# Patient Record
Sex: MALE | Race: BLACK OR AFRICAN AMERICAN | Employment: PART TIME | URBAN - METROPOLITAN AREA
[De-identification: names, ages, dates, MRNs, and addresses within clinical notes are randomized per-mention and may not be internally consistent; named-entity substitution may affect disease eponyms.]

---

## 2017-10-25 ENCOUNTER — HOSPITAL ENCOUNTER (EMERGENCY)
Age: 52
Discharge: HOME | End: 2017-10-26
Attending: EMERGENCY MEDICINE | Admitting: EMERGENCY MEDICINE
Payer: COMMERCIAL

## 2017-10-25 ENCOUNTER — APPOINTMENT (OUTPATIENT)
Dept: GENERAL RADIOLOGY | Age: 52
End: 2017-10-25
Attending: EMERGENCY MEDICINE
Payer: COMMERCIAL

## 2017-10-25 DIAGNOSIS — S20.212A CONTUSION OF RIB ON LEFT SIDE, INITIAL ENCOUNTER: ICD-10-CM

## 2017-10-25 DIAGNOSIS — R55 NEAR SYNCOPE: Primary | ICD-10-CM

## 2017-10-25 DIAGNOSIS — S00.83XA CONTUSION OF JAW, INITIAL ENCOUNTER: ICD-10-CM

## 2017-10-25 PROCEDURE — 99282 EMERGENCY DEPT VISIT SF MDM: CPT

## 2017-10-25 PROCEDURE — 80053 COMPREHEN METABOLIC PANEL: CPT | Performed by: EMERGENCY MEDICINE

## 2017-10-25 PROCEDURE — 85025 COMPLETE CBC W/AUTO DIFF WBC: CPT | Performed by: EMERGENCY MEDICINE

## 2017-10-25 PROCEDURE — 71010 XR CHEST PORT: CPT

## 2017-10-25 PROCEDURE — 82550 ASSAY OF CK (CPK): CPT | Performed by: EMERGENCY MEDICINE

## 2017-10-25 PROCEDURE — 93005 ELECTROCARDIOGRAM TRACING: CPT

## 2017-10-25 PROCEDURE — 83735 ASSAY OF MAGNESIUM: CPT | Performed by: EMERGENCY MEDICINE

## 2017-10-25 NOTE — LETTER
NOTIFICATION RETURN TO WORK / SCHOOL 
 
10/26/2017 3:12 AM 
 
Mr. Olayinka Tomlin 
[address] [facility] [place] [address] To Whom It May Concern: 
 
Olayinka Tomlin is currently under the care of [facility] EMERGENCY DEPT. He will return to work/school on: 10/27/2017 If there are questions or concerns please have the patient contact our office.  
 
 
 
Sincerely, 
 
 
Denise Kothari MD

## 2017-10-26 VITALS
OXYGEN SATURATION: 100 % | HEART RATE: 96 BPM | SYSTOLIC BLOOD PRESSURE: 133 MMHG | HEIGHT: 70 IN | TEMPERATURE: 97.1 F | BODY MASS INDEX: 32.35 KG/M2 | WEIGHT: 226 LBS | DIASTOLIC BLOOD PRESSURE: 85 MMHG | RESPIRATION RATE: 16 BRPM

## 2017-10-26 LAB
ALBUMIN SERPL-MCNC: 3.4 G/DL (ref 3.4–5)
ALBUMIN/GLOB SERPL: 1 {RATIO} (ref 0.8–1.7)
ALP SERPL-CCNC: 147 U/L (ref 45–117)
ALT SERPL-CCNC: 32 U/L (ref 16–61)
ANION GAP SERPL CALC-SCNC: 9 MMOL/L (ref 3–18)
AST SERPL-CCNC: 13 U/L (ref 15–37)
ATRIAL RATE: 101 BPM
BASOPHILS # BLD: 0 K/UL (ref 0–0.1)
BASOPHILS NFR BLD: 0 % (ref 0–2)
BILIRUB SERPL-MCNC: 0.2 MG/DL (ref 0.2–1)
BUN SERPL-MCNC: 27 MG/DL (ref 7–18)
BUN/CREAT SERPL: 21 (ref 12–20)
CALCIUM SERPL-MCNC: 8.5 MG/DL (ref 8.5–10.1)
CALCULATED P AXIS, ECG09: 23 DEGREES
CALCULATED R AXIS, ECG10: 6 DEGREES
CALCULATED T AXIS, ECG11: -8 DEGREES
CHLORIDE SERPL-SCNC: 104 MMOL/L (ref 100–108)
CK MB CFR SERPL CALC: 1 % (ref 0–4)
CK MB SERPL-MCNC: 2.3 NG/ML (ref 5–25)
CK SERPL-CCNC: 230 U/L (ref 39–308)
CO2 SERPL-SCNC: 24 MMOL/L (ref 21–32)
CREAT SERPL-MCNC: 1.3 MG/DL (ref 0.6–1.3)
DIAGNOSIS, 93000: NORMAL
DIFFERENTIAL METHOD BLD: ABNORMAL
EOSINOPHIL # BLD: 0.3 K/UL (ref 0–0.4)
EOSINOPHIL NFR BLD: 3 % (ref 0–5)
ERYTHROCYTE [DISTWIDTH] IN BLOOD BY AUTOMATED COUNT: 14.1 % (ref 11.6–14.5)
FLUAV AG NPH QL IA: NEGATIVE
FLUBV AG NOSE QL IA: NEGATIVE
GLOBULIN SER CALC-MCNC: 3.5 G/DL (ref 2–4)
GLUCOSE SERPL-MCNC: 90 MG/DL (ref 74–99)
HCT VFR BLD AUTO: 38.7 % (ref 36–48)
HGB BLD-MCNC: 13.5 G/DL (ref 13–16)
LYMPHOCYTES # BLD: 2.3 K/UL (ref 0.9–3.6)
LYMPHOCYTES NFR BLD: 30 % (ref 21–52)
MAGNESIUM SERPL-MCNC: 2.1 MG/DL (ref 1.6–2.6)
MCH RBC QN AUTO: 31.5 PG (ref 24–34)
MCHC RBC AUTO-ENTMCNC: 34.9 G/DL (ref 31–37)
MCV RBC AUTO: 90.4 FL (ref 74–97)
MONOCYTES # BLD: 0.7 K/UL (ref 0.05–1.2)
MONOCYTES NFR BLD: 9 % (ref 3–10)
NEUTS SEG # BLD: 4.5 K/UL (ref 1.8–8)
NEUTS SEG NFR BLD: 58 % (ref 40–73)
P-R INTERVAL, ECG05: 144 MS
PLATELET # BLD AUTO: 235 K/UL (ref 135–420)
PMV BLD AUTO: 10.2 FL (ref 9.2–11.8)
POTASSIUM SERPL-SCNC: 3.5 MMOL/L (ref 3.5–5.5)
PROT SERPL-MCNC: 6.9 G/DL (ref 6.4–8.2)
Q-T INTERVAL, ECG07: 358 MS
QRS DURATION, ECG06: 90 MS
QTC CALCULATION (BEZET), ECG08: 464 MS
RBC # BLD AUTO: 4.28 M/UL (ref 4.7–5.5)
SODIUM SERPL-SCNC: 137 MMOL/L (ref 136–145)
TROPONIN I SERPL-MCNC: <0.02 NG/ML (ref 0–0.04)
VENTRICULAR RATE, ECG03: 101 BPM
WBC # BLD AUTO: 7.7 K/UL (ref 4.6–13.2)

## 2017-10-26 PROCEDURE — 74011250636 HC RX REV CODE- 250/636: Performed by: EMERGENCY MEDICINE

## 2017-10-26 PROCEDURE — 96361 HYDRATE IV INFUSION ADD-ON: CPT

## 2017-10-26 PROCEDURE — 96374 THER/PROPH/DIAG INJ IV PUSH: CPT

## 2017-10-26 PROCEDURE — 87804 INFLUENZA ASSAY W/OPTIC: CPT | Performed by: EMERGENCY MEDICINE

## 2017-10-26 RX ORDER — KETOROLAC TROMETHAMINE 30 MG/ML
15 INJECTION, SOLUTION INTRAMUSCULAR; INTRAVENOUS
Status: COMPLETED | OUTPATIENT
Start: 2017-10-26 | End: 2017-10-26

## 2017-10-26 RX ADMIN — SODIUM CHLORIDE 1000 ML: 9 INJECTION, SOLUTION INTRAVENOUS at 00:51

## 2017-10-26 RX ADMIN — KETOROLAC TROMETHAMINE 15 MG: 30 INJECTION, SOLUTION INTRAMUSCULAR at 00:51

## 2017-10-26 NOTE — ED NOTES
Patient for discharge home in no acute distress at this time. Discharge instructions given spouse with patient.

## 2017-10-26 NOTE — DISCHARGE INSTRUCTIONS
Vasovagal Syncope: Care Instructions  Your Care Instructions    Vasovagal syncope (say \"cws-[id number]-GFRRiana CONDE-kuh-pee\")is sudden dizziness or fainting that can be set off by things such as pain, stress, fear, or trauma. You may sweat or feel lightheaded, sick to your stomach, or tingly. The problem causes the heart rate to slow and the blood vessels to widen, or dilate, for a short time. When this happens, blood pools in the lower body, and less blood goes to the brain. You can usually get relief by lying down with your legs raised (elevated). This helps more blood to flow to your brain and may help relieve symptoms like feeling dizzy. Some doctors may recommend a technique that involves tensing your fists and arms. This type of fainting is often easy to predict. For example, it happens to some people when they see blood or have to get a shot. They may feel symptoms before they faint. An episode of vasovagal syncope usually responds well to self-care. Other treatment often isn't needed. But if the fainting keeps happening, your doctor may suggest further treatments. Follow-up care is a key part of your treatment and safety. Be sure to make and go to all appointments, and call your doctor if you are having problems. It's also a good idea to know your test results and keep a list of the medicines you take. How can you care for yourself at home? · Drink plenty of fluids to prevent dehydration. If you have kidney, heart, or liver disease and have to limit fluids, talk with your doctor before you increase your fluid intake. · Try to avoid things that you think may set off vasovagal syncope. · Talk to your doctor about any medicines you take. Some medicines may increase the chance of this condition occurring. · If you feel symptoms, lie down with your legs raised. Talk to your doctor about what to do if your symptoms come back. When should you call for help?   Call 911 anytime you think you may need emergency care. For example, call if:  ? · You have symptoms of a heart problem. These may include:  ¨ Chest pain or pressure. ¨ Severe trouble breathing. ¨ A fast or irregular heartbeat. ? Watch closely for changes in your health, and be sure to contact your doctor if:  ? · You have more episodes of fainting at home. ? · You do not get better as expected. Where can you learn more? Go to http://mae-rakan.info/. Enter L754 in the search box to learn more about \"Vasovagal Syncope: Care Instructions. \"  Current as of: March 20, 2017  Content Version: 11.4  © 9490-1706 [employer]. Care instructions adapted under license by [employer] (which disclaims liability or warranty for this information). If you have questions about a medical condition or this instruction, always ask your healthcare professional. [place] [address] any warranty or liability for your use of this information.

## 2017-10-26 NOTE — ED PROVIDER NOTES
HPI Comments: Seen at: 10/26/17 12:21 AM    Daksha Moss is a 45 y.o. male with no PMHx presenting to the ED with c/o near syncopal episode that occurred tonight. Pt states he was sitting on the commode having a BM when he began to feel lightheaded and dizzy. Reports when he stood up from the bathroom he stumbled back to the bad and fell to the floor. Denies LOC, but \"got close. \" Patient does report that he hit his jaw and ribs when he fall and notes left sided rib pain and right sided jaw pain from the fall. Pt denies nausea, vomiting, diarrhea, fever, or chills, Associated sx include productive cough and nasal congestion that he has had for the last week. Pt denies hx of CVA, MI, blood clots or any cardiac problems. Pt states he is feeling better here at the ED. Denies any CP, SOB or palpitations preceding the near syncopal event. Severity is 0/10. Pt has no other sx or complaints. PCP: MD Ananya      The history is provided by the patient. No past medical history on file. No past surgical history on file. No family history on file. Social History     Social History    Marital status:      Spouse name: N/A    Number of children: N/A    Years of education: N/A     Occupational History    Not on file. Social History Main Topics    Smoking status: Not on file    Smokeless tobacco: Not on file    Alcohol use Not on file    Drug use: Not on file    Sexual activity: Not on file     Other Topics Concern    Not on file     Social History Narrative         ALLERGIES: Review of patient's allergies indicates no known allergies. Review of Systems   Constitutional: Negative. Negative for chills and fever. HENT: Positive for congestion (nasal congestion ). Eyes: Negative. Negative for visual disturbance. Respiratory: Positive for cough. Negative for shortness of breath. Cardiovascular: Negative. Negative for chest pain and leg swelling. Gastrointestinal: Negative for abdominal pain, diarrhea and vomiting. Genitourinary: Negative. Negative for dysuria. Musculoskeletal: Negative. Negative for back pain and myalgias. Left sided rib pain   Right sided jaw pain   Skin: Negative. Negative for rash and wound. Neurological: Positive for syncope (near syncope) and light-headedness. Negative for dizziness and weakness. Psychiatric/Behavioral: Negative. Negative for self-injury. All other systems reviewed and are negative. Vitals:    10/25/17 2324 10/26/17 0244   BP: 104/55 133/85   Pulse: 100 96   Resp: 21 16   Temp: 97 °F (36.1 °C) 97.1 °F (36.2 °C)   SpO2: 100%    Weight: 102.5 kg (226 lb)    Height: 5' 10\" (1.778 m)             Physical Exam   Constitutional: He is oriented to person, place, and time. He appears well-developed and well-nourished. No distress. HENT:   Head: Normocephalic and atraumatic. Mouth/Throat: Mucous membranes are dry. Nasal congestion   Mild tenderness over right jaw   No crepitus   No trismus   Normal occlusion   No blood in mouth      Eyes: Conjunctivae and EOM are normal. Pupils are equal, round, and reactive to light. No scleral icterus. Neck: Normal range of motion. Neck supple. No JVD present. No thyromegaly present. Cardiovascular: Normal rate, regular rhythm, S1 normal and S2 normal.  Exam reveals no gallop and no friction rub. No murmur heard. Pulmonary/Chest: Effort normal and breath sounds normal. No accessory muscle usage. No respiratory distress. Abdominal: Soft. Normal appearance. He exhibits no distension. There is no tenderness. There is no rigidity, no rebound and no guarding. Musculoskeletal: Normal range of motion. He exhibits no edema or tenderness. Mild tenderness over the left lower lateral ribs at the posterior axillary line    Neurological: He is alert and oriented to person, place, and time. He has normal strength. No cranial nerve deficit or sensory deficit. Coordination normal.   Skin: Skin is warm and intact. No rash noted. Psychiatric: He has a normal mood and affect. His speech is normal and behavior is normal.   Vitals reviewed. MDM  Number of Diagnoses or Management Options  Contusion of jaw, initial encounter:   Contusion of rib on left side, initial encounter:   Near syncope:   Diagnosis management comments: Olayinka Tomlin is a 45 y.o. Male coming in after a near syncopal event that started while having a BM. Well appearing, low suspicion of cardiac etiology of symptoms. Likely vasovagal syncope. Pateint labs and EKG reassuring. No sx consistent with PE. Will d/c home to follow up with PCP as an outpatient. ED Course       Procedures    Vitals:  Patient Vitals for the past 12 hrs:   Temp Pulse Resp BP SpO2   10/26/17 0244 97.1 °F (36.2 °C) 96 16 133/85 -   10/25/17 2324 97 °F (36.1 °C) 100 21 104/55 100 %       Medications Ordered:  Medications   sodium chloride 0.9 % bolus infusion 1,000 mL (1,000 mL IntraVENous New Bag 10/26/17 0051)   ketorolac (TORADOL) injection 15 mg (15 mg IntraVENous Given 10/26/17 0051)       Lab Findings:  Recent Results (from the past 12 hour(s))   EKG, 12 LEAD, INITIAL    Collection Time: 10/25/17 11:32 PM   Result Value Ref Range    Ventricular Rate 101 BPM    Atrial Rate 101 BPM    P-R Interval 144 ms    QRS Duration 90 ms    Q-T Interval 358 ms    QTC Calculation (Bezet) 464 ms    Calculated P Axis 23 degrees    Calculated R Axis 6 degrees    Calculated T Axis -8 degrees    Diagnosis       Sinus tachycardia  Minimal voltage criteria for LVH, may be normal variant  Borderline ECG  No previous ECGs available     INFLUENZA A & B AG (RAPID TEST)    Collection Time: 10/26/17  2:02 AM   Result Value Ref Range    Influenza A Antigen NEGATIVE  NEG      Influenza B Antigen NEGATIVE  NEG         EKG Interpretation by ED physician:  Sinus tachycardia.  Rate of 101 bpm. No evidence of WPW, HOCM, Brugada, prolonged QTc, or acute ischemia on ekg. X-ray, CT or radiology findings or impressions:  XR CHEST PORT   Interpretation by ED physician: Old right rib fracture. No acute process. Reevaluation of the patient:     2:35 AM: Pt is sitting in bed, denies any lightheadedness, but is still complaining of slight cough. No SOB. Pt walked to bathroom without any difficulty or lightheadedness. No other complaints. Discussed follow up and return precautions and patient in agreement with plan. Diagnosis:   1. Near syncope    2. Contusion of rib on left side, initial encounter    3. Contusion of jaw, initial encounter        Disposition: Discharge    Follow-up Information     Follow up With Details Comments [address] [facility] Jigna Chaves MD Call in 1 day  [address] [place] [address] [address] [facility] 61      [facility] EMERGENCY DEPT  As needed, If symptoms worsen [address] [facility] [address]  [phone]           Patient's Medications   Start Taking    No medications on file   Continue Taking    OTHER    For hypertension, pt unable to recall name of medicine   These Medications have changed    No medications on file   Stop Taking    No medications on file       Scribe Attestation      Mary Dietrich acting as a scribe for and in the presence of Yaima Vivar MD      October 25, 2017 at 11:37 PM       Provider Attestation:      I personally performed the services described in the documentation, reviewed the documentation, as recorded by the scribe in my presence, and it accurately and completely records my words and actions.  October 25, 2017 at 11:37 PM - Yaima Vivar MD

## 2018-05-04 ENCOUNTER — OFFICE VISIT (OUTPATIENT)
Dept: ORTHOPEDIC SURGERY | Age: 53
End: 2018-05-04

## 2018-05-04 VITALS
WEIGHT: 218.4 LBS | HEIGHT: 70 IN | OXYGEN SATURATION: 97 % | TEMPERATURE: 97.6 F | RESPIRATION RATE: 16 BRPM | HEART RATE: 96 BPM | SYSTOLIC BLOOD PRESSURE: 124 MMHG | BODY MASS INDEX: 31.26 KG/M2 | DIASTOLIC BLOOD PRESSURE: 81 MMHG

## 2018-05-04 DIAGNOSIS — M25.561 CHRONIC PAIN OF RIGHT KNEE: Primary | ICD-10-CM

## 2018-05-04 DIAGNOSIS — G89.29 CHRONIC LEFT SHOULDER PAIN: ICD-10-CM

## 2018-05-04 DIAGNOSIS — M17.11 PRIMARY OSTEOARTHRITIS OF RIGHT KNEE: ICD-10-CM

## 2018-05-04 DIAGNOSIS — M25.512 CHRONIC LEFT SHOULDER PAIN: ICD-10-CM

## 2018-05-04 DIAGNOSIS — G89.29 CHRONIC PAIN OF RIGHT KNEE: Primary | ICD-10-CM

## 2018-05-04 DIAGNOSIS — M25.512 LEFT SHOULDER PAIN, UNSPECIFIED CHRONICITY: ICD-10-CM

## 2018-05-04 RX ORDER — BETAMETHASONE SODIUM PHOSPHATE AND BETAMETHASONE ACETATE 3; 3 MG/ML; MG/ML
6 INJECTION, SUSPENSION INTRA-ARTICULAR; INTRALESIONAL; INTRAMUSCULAR; SOFT TISSUE ONCE
Qty: 1 ML | Refills: 0 | Status: CANCELLED
Start: 2018-05-04 | End: 2018-05-04

## 2018-05-04 RX ORDER — OXYCODONE AND ACETAMINOPHEN 5; 325 MG/1; MG/1
TABLET ORAL
COMMUNITY
End: 2018-07-17

## 2018-05-04 RX ORDER — IBUPROFEN 800 MG/1
800 TABLET ORAL
COMMUNITY
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
## 2018-05-11 ENCOUNTER — TELEPHONE (OUTPATIENT)
Dept: ORTHOPEDIC SURGERY | Age: 53
End: 2018-05-11

## 2018-05-14 NOTE — TELEPHONE ENCOUNTER
Message given to Eris Pruett in the [facility] to call patient and schedule an appointment per PABLO Bond.

## 2018-07-13 ENCOUNTER — HOSPITAL ENCOUNTER (OUTPATIENT)
Dept: OCCUPATIONAL MEDICINE | Age: 53
Discharge: HOME | End: 2018-07-13
Attending: FAMILY MEDICINE

## 2018-07-13 DIAGNOSIS — T14.90XA TRAUMA: ICD-10-CM

## 2018-07-17 ENCOUNTER — OFFICE VISIT (OUTPATIENT)
Dept: ORTHOPEDIC SURGERY | Age: 53
End: 2018-07-17

## 2018-07-17 ENCOUNTER — HOSPITAL ENCOUNTER (OUTPATIENT)
Dept: LAB | Age: 53
Discharge: HOME | End: 2018-07-17
Payer: COMMERCIAL

## 2018-07-17 VITALS
DIASTOLIC BLOOD PRESSURE: 86 MMHG | WEIGHT: 217 LBS | HEIGHT: 70 IN | BODY MASS INDEX: 31.07 KG/M2 | OXYGEN SATURATION: 97 % | SYSTOLIC BLOOD PRESSURE: 139 MMHG | TEMPERATURE: 97.1 F | HEART RATE: 87 BPM

## 2018-07-17 DIAGNOSIS — L02.511 ABSCESS OF THUMB, RIGHT: ICD-10-CM

## 2018-07-17 DIAGNOSIS — L02.511 ABSCESS OF THUMB, RIGHT: Primary | ICD-10-CM

## 2018-07-17 LAB
ANION GAP SERPL CALC-SCNC: 7 MMOL/L (ref 3–18)
BASOPHILS # BLD: 0 K/UL (ref 0–0.06)
BASOPHILS NFR BLD: 0 % (ref 0–2)
BUN SERPL-MCNC: 21 MG/DL (ref 7–18)
BUN/CREAT SERPL: 22 (ref 12–20)
CALCIUM SERPL-MCNC: 9 MG/DL (ref 8.5–10.1)
CHLORIDE SERPL-SCNC: 107 MMOL/L (ref 100–108)
CO2 SERPL-SCNC: 28 MMOL/L (ref 21–32)
CREAT SERPL-MCNC: 0.97 MG/DL (ref 0.6–1.3)
DIFFERENTIAL METHOD BLD: ABNORMAL
EOSINOPHIL # BLD: 0.4 K/UL (ref 0–0.4)
EOSINOPHIL NFR BLD: 5 % (ref 0–5)
ERYTHROCYTE [DISTWIDTH] IN BLOOD BY AUTOMATED COUNT: 14 % (ref 11.6–14.5)
GLUCOSE SERPL-MCNC: 87 MG/DL (ref 74–99)
HCT VFR BLD AUTO: 41.3 % (ref 36–48)
HGB BLD-MCNC: 14.4 G/DL (ref 13–16)
LYMPHOCYTES # BLD: 2.6 K/UL (ref 0.9–3.6)
LYMPHOCYTES NFR BLD: 37 % (ref 21–52)
MCH RBC QN AUTO: 31 PG (ref 24–34)
MCHC RBC AUTO-ENTMCNC: 34.9 G/DL (ref 31–37)
MCV RBC AUTO: 89 FL (ref 74–97)
MONOCYTES # BLD: 0.7 K/UL (ref 0.05–1.2)
MONOCYTES NFR BLD: 10 % (ref 3–10)
NEUTS SEG # BLD: 3.4 K/UL (ref 1.8–8)
NEUTS SEG NFR BLD: 48 % (ref 40–73)
PLATELET # BLD AUTO: 292 K/UL (ref 135–420)
PMV BLD AUTO: 10.5 FL (ref 9.2–11.8)
POTASSIUM SERPL-SCNC: 4.4 MMOL/L (ref 3.5–5.5)
RBC # BLD AUTO: 4.64 M/UL (ref 4.7–5.5)
SODIUM SERPL-SCNC: 142 MMOL/L (ref 136–145)
WBC # BLD AUTO: 7.2 K/UL (ref 4.6–13.2)

## 2018-07-17 PROCEDURE — 93005 ELECTROCARDIOGRAM TRACING: CPT

## 2018-07-17 PROCEDURE — 80048 BASIC METABOLIC PNL TOTAL CA: CPT | Performed by: PHYSICIAN ASSISTANT

## 2018-07-17 PROCEDURE — 85025 COMPLETE CBC W/AUTO DIFF WBC: CPT | Performed by: PHYSICIAN ASSISTANT

## 2018-07-17 PROCEDURE — 36415 COLL VENOUS BLD VENIPUNCTURE: CPT | Performed by: PHYSICIAN ASSISTANT

## 2018-07-17 RX ORDER — COLCHICINE 0.6 MG/1
0.6 TABLET ORAL
COMMUNITY
Start: 2018-04-29

## 2018-07-17 RX ORDER — AMOXICILLIN AND CLAVULANATE POTASSIUM 875; 125 MG/1; MG/1
1 TABLET, FILM COATED ORAL 2 TIMES DAILY
COMMUNITY
Start: 2018-07-13 | End: 2019-10-22

## 2018-07-17 RX ORDER — LISINOPRIL AND HYDROCHLOROTHIAZIDE 10; 12.5 MG/1; MG/1
1 TABLET ORAL DAILY
COMMUNITY
Start: 2018-07-13
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
## 2018-07-17 NOTE — LETTER
Patient: Jeanne Galvez Surgery Date: 7/18/18  Time: 12:30PM 
 
PROCEDURE: RIGHT THUMB I&D Report for your Surgery at 11:30  @ [facility]: 
  
o Five (5) days prior to surgery STOP taking ASPIRIN, ANTI-INFLAMMATORY , and/or BLOOD THINNER MEDICATIONS (such as COUMADIN, PLAVIX, HEPARIN or others). o If you are taking blood thinner medication please contact your prescribing physician for any special instructions. LAB: Report to  [facility]  or [facility] [place]  14 days before your surgery date. (Your orders for these tests are in [address] [facility] at the [address] may not eat or drink anything after midnight the night before  
o It doesn't matter if you have eaten before going to the [facility] 
o If required labs and EKG are not completed Surgery will be canceled. THE DAY OF SURGERY: 
  
 
o Do not eat, chew gum or drink anything after Midnight prior to the date of your surgery 
o Take your regular medications with small sips of water unless otherwise instructed. (This means blood pressure and/or heart medicine) If you are insulin dependent, bring your insulin with you, unless otherwise instructed. 
o Bring a list of your medications and the dosage to the hospital. 
o Do not wear nail polish, make-up or jewelry. o Do not bring valuables or money to the hospital. 
o Have someone accompany you so they may drive you home following the surgery. Bring your drivers license, your insurance card and Hospital co-pay. Return for your postoperative visit on Wednesday, July 25, 2018 02:15 PM at the [facility] SURGICAL [id number] [place], [address] This appointment will be with , Dr. Joy Morgna physician assistant. Wily Dozier  [phone]      Surgical Coordinator

## 2018-07-18 LAB
ATRIAL RATE: 81 BPM
CALCULATED P AXIS, ECG09: 63 DEGREES
CALCULATED R AXIS, ECG10: 25 DEGREES
CALCULATED T AXIS, ECG11: 23 DEGREES
DIAGNOSIS, 93000: NORMAL
P-R INTERVAL, ECG05: 142 MS
Q-T INTERVAL, ECG07: 364 MS
QRS DURATION, ECG06: 76 MS
QTC CALCULATION (BEZET), ECG08: 422 MS
VENTRICULAR RATE, ECG03: 81 BPM

## 2018-07-19 ENCOUNTER — HOME HEALTH ADMISSION (OUTPATIENT)
Dept: HOME HEALTH SERVICES | Facility: HOME HEALTH | Age: 53
End: 2018-07-19
Payer: OTHER MISCELLANEOUS

## 2018-07-19 ENCOUNTER — HOSPITAL ENCOUNTER (OUTPATIENT)
Dept: LAB | Age: 53
Discharge: HOME | End: 2018-07-19
Payer: COMMERCIAL

## 2018-07-19 PROCEDURE — 87070 CULTURE OTHR SPECIMN AEROBIC: CPT | Performed by: ORTHOPAEDIC SURGERY

## 2018-07-19 PROCEDURE — 87077 CULTURE AEROBIC IDENTIFY: CPT | Performed by: ORTHOPAEDIC SURGERY

## 2018-07-19 PROCEDURE — 87075 CULTR BACTERIA EXCEPT BLOOD: CPT | Performed by: ORTHOPAEDIC SURGERY

## 2018-07-19 PROCEDURE — 87186 SC STD MICRODIL/AGAR DIL: CPT | Performed by: ORTHOPAEDIC SURGERY

## 2018-07-19 PROCEDURE — 87205 SMEAR GRAM STAIN: CPT | Performed by: ORTHOPAEDIC SURGERY

## 2018-07-20 ENCOUNTER — HOME CARE VISIT (OUTPATIENT)
Dept: SCHEDULING | Facility: HOME HEALTH | Age: 53
End: 2018-07-20
Payer: OTHER MISCELLANEOUS

## 2018-07-20 PROCEDURE — 400013 HH SOC

## 2018-07-20 PROCEDURE — G0299 HHS/HOSPICE OF RN EA 15 MIN: HCPCS

## 2018-07-21 ENCOUNTER — HOME CARE VISIT (OUTPATIENT)
Dept: SCHEDULING | Facility: HOME HEALTH | Age: 53
End: 2018-07-21
Payer: OTHER MISCELLANEOUS

## 2018-07-21 VITALS
TEMPERATURE: 97 F | OXYGEN SATURATION: 98 % | SYSTOLIC BLOOD PRESSURE: 110 MMHG | DIASTOLIC BLOOD PRESSURE: 60 MMHG | HEART RATE: 72 BPM

## 2018-07-21 PROCEDURE — G0299 HHS/HOSPICE OF RN EA 15 MIN: HCPCS

## 2018-07-22 ENCOUNTER — HOME CARE VISIT (OUTPATIENT)
Dept: SCHEDULING | Facility: HOME HEALTH | Age: 53
End: 2018-07-22
Payer: OTHER MISCELLANEOUS

## 2018-07-22 LAB
BACTERIA SPEC CULT: ABNORMAL
BACTERIA SPEC CULT: ABNORMAL
GRAM STN SPEC: ABNORMAL
GRAM STN SPEC: ABNORMAL
SERVICE CMNT-IMP: ABNORMAL

## 2018-07-22 PROCEDURE — G0299 HHS/HOSPICE OF RN EA 15 MIN: HCPCS

## 2018-07-23 ENCOUNTER — DOCUMENTATION ONLY (OUTPATIENT)
Dept: ORTHOPEDIC SURGERY | Age: 53
End: 2018-07-23

## 2018-07-23 ENCOUNTER — HOME CARE VISIT (OUTPATIENT)
Dept: SCHEDULING | Facility: HOME HEALTH | Age: 53
End: 2018-07-23
Payer: OTHER MISCELLANEOUS

## 2018-07-23 ENCOUNTER — OFFICE VISIT (OUTPATIENT)
Dept: ORTHOPEDIC SURGERY | Age: 53
End: 2018-07-23

## 2018-07-23 VITALS
HEIGHT: 70 IN | HEART RATE: 93 BPM | WEIGHT: 221.4 LBS | RESPIRATION RATE: 16 BRPM | SYSTOLIC BLOOD PRESSURE: 122 MMHG | BODY MASS INDEX: 31.7 KG/M2 | DIASTOLIC BLOOD PRESSURE: 68 MMHG | OXYGEN SATURATION: 96 % | TEMPERATURE: 97.5 F

## 2018-07-23 VITALS
RESPIRATION RATE: 16 BRPM | OXYGEN SATURATION: 98 % | TEMPERATURE: 97 F | DIASTOLIC BLOOD PRESSURE: 76 MMHG | SYSTOLIC BLOOD PRESSURE: 122 MMHG | HEART RATE: 78 BPM

## 2018-07-23 DIAGNOSIS — L02.511 ABSCESS OF THUMB, RIGHT: Primary | ICD-10-CM

## 2018-07-23 DIAGNOSIS — Z98.890 STATUS POST INCISION AND DRAINAGE: ICD-10-CM

## 2018-07-23 LAB
BACTERIA SPEC CULT: ABNORMAL
GRAM STN SPEC: ABNORMAL
GRAM STN SPEC: ABNORMAL
SERVICE CMNT-IMP: ABNORMAL

## 2018-07-23 PROCEDURE — G0299 HHS/HOSPICE OF RN EA 15 MIN: HCPCS

## 2018-07-23 NOTE — PROGRESS NOTES
Nessa Mitchell  1965     HISTORY OF PRESENT ILLNESS  Nessa Mitchell is a 52 y.o. male who presents today for evaluation s/p right thumb incision and drainage on 7/19/18. He comes in today for follow up. He rates his pain 0/10 today. Home health changed his dressing today. He is doing well and has been talking his ABX as prescribed. He does not have concerns or questions today. Patient denies any fever, chills, chest pain, shortness of breath or calf pain. There are no new illness or injuries to report since last seen in the office. PHYSICAL EXAM:   Visit Vitals    /68    Pulse 93    Temp 97.5 °F (36.4 °C) (Oral)    Resp 16    Ht 5' 10\" (1.778 m)    Wt 221 lb 6.4 oz (100.4 kg)    SpO2 96%    BMI 31.77 kg/m2      The patient is a well-developed, well-nourished male in no acute distress. The patient is alert and oriented times three. The patient appears to be well groomed. Mood and affect are normal.  ORTHOPEDIC EXAM of right thumb:   Inspection: minimal swelling, no bruising, dressing in place  Incision, clean, dry, intact, sutures in place  Range of motion: N/A  ttp none  Stability: Stable  Strength: N/A    CULTURES:   Wound  Component Results      Component Value Ref Range & Units Status     Special Requests: RIGHT   THUMB      Final     GRAM STAIN FEW WBC'S   Final     GRAM STAIN    Final     RARE GRAM POSITIVE COCCI IN PAIRS     Culture result:    Final     FEW ESCHERICHIA COLI 2 MORPHOTYPES SAME SUSCEPTIBILITY (A)     Culture result:    Final     FEW STAPHYLOCOCCUS LUGDUNENSIS (A)     Culture result:    Final     RARE BACILLUS SPECIES, NOT ANTHRACIS (A)       Tissue  Component Results      Component Value Ref Range & Units Status     Special Requests: RIGHT   THUMB      Final     GRAM STAIN NO WBC'S SEEN   Final     GRAM STAIN NO ORGANISMS SEEN   Final     Culture result: FEW ESCHERICHIA COLI (A)   Final     Culture result:    Final     FEW STAPHYLOCOCCUS LUGDUNENSIS (A)     Anerobic  Component Results      Component Value Ref Range & Units Status     Special Requests: RIGHT   THUMB      Preliminary     Culture result: NO ANAEROBES ISOLATED 4 DAYS   Preliminary             XR: read and reviewed by Dr. Carin Calderon:      ICD-10-CM ICD-9-CM    1. Abscess of thumb, right L02.511 681.00 AMB POC XRAY, HAND; 3+ VIEWS      REFERRAL TO INFECTIOUS DISEASE   2. Status post incision and drainage Z98.890 V45.89 AMB POC XRAY, HAND; 3+ VIEWS      REFERRAL TO INFECTIOUS DISEASE        PLAN:   Pt doing well post operatively  He will continue his ABX as prescribed  Will get him in to see Dr. Aaron Lawton with ID to evaluate him in the next day or two  Will have him come back in next week to look at his incision and see how he is doing.     Patient seen and evaluated by Dr. Sara Martinez today who agrees with treatment plan    Follow-up Disposition: Not on [address] [place] [address], [place] [address] and Spine Specialist

## 2018-07-23 NOTE — PROGRESS NOTES
Home health nurse Miranda Ludwig called in states that the patient was put on amoxicillin-clavulanate (AUGMENTIN) 875-125 mg per tablet   On 07/13/18 and then was put on Bactrim 07/19/18. Per Miranda Ludwig patient did not finish Augmentin and wanted to know if he needed to. Per PABLO Blackwell yes pt was to finish Augmentin. Miranda Ludwig stated that she understood and would let patient know.

## 2018-07-24 ENCOUNTER — HOME CARE VISIT (OUTPATIENT)
Dept: HOME HEALTH SERVICES | Facility: HOME HEALTH | Age: 53
End: 2018-07-24
Payer: OTHER MISCELLANEOUS

## 2018-07-24 ENCOUNTER — HOME CARE VISIT (OUTPATIENT)
Dept: SCHEDULING | Facility: HOME HEALTH | Age: 53
End: 2018-07-24
Payer: OTHER MISCELLANEOUS

## 2018-07-24 ENCOUNTER — TELEPHONE (OUTPATIENT)
Dept: ORTHOPEDIC SURGERY | Age: 53
End: 2018-07-24

## 2018-07-24 VITALS
SYSTOLIC BLOOD PRESSURE: 110 MMHG | DIASTOLIC BLOOD PRESSURE: 68 MMHG | TEMPERATURE: 98.4 F | OXYGEN SATURATION: 97 % | HEART RATE: 92 BPM | RESPIRATION RATE: 16 BRPM

## 2018-07-24 LAB
BACTERIA SPEC CULT: NORMAL
SERVICE CMNT-IMP: NORMAL

## 2018-07-24 PROCEDURE — G0299 HHS/HOSPICE OF RN EA 15 MIN: HCPCS

## 2018-07-24 NOTE — TELEPHONE ENCOUNTER
Spoke with patient and informed that Doronberto Monroe stated for patient to be out of work until 7/30/18. Patient stated that Workers [employer] needs is asking about his work status, but he did not have a number for us to send work note to.

## 2018-07-24 NOTE — TELEPHONE ENCOUNTER
Patient called asking if he's supposed to stay out of work until his next appointment with Dr. Jones Gamez on 07/30/18. Please advise patient regarding this at [phone].

## 2018-07-25 ENCOUNTER — HOME CARE VISIT (OUTPATIENT)
Dept: SCHEDULING | Facility: HOME HEALTH | Age: 53
End: 2018-07-25
Payer: OTHER MISCELLANEOUS

## 2018-07-25 ENCOUNTER — TELEPHONE (OUTPATIENT)
Dept: ORTHOPEDIC SURGERY | Age: 53
End: 2018-07-25

## 2018-07-25 VITALS — RESPIRATION RATE: 18 BRPM

## 2018-07-25 DIAGNOSIS — L02.511 ABSCESS OF THUMB, RIGHT: Primary | ICD-10-CM

## 2018-07-25 PROCEDURE — G0299 HHS/HOSPICE OF RN EA 15 MIN: HCPCS

## 2018-07-25 NOTE — TELEPHONE ENCOUNTER
Spoke with Datalot from Home health wound care, she states that wound is healing and there is nothing to pack. She needs clarification on dressing change order. Spoke with PABLO Oliveros, she states to put in home health wound care order for dressing changes every other day,  clean wound, apply Xeroform, 4x4 and kerlix to right thumb. I put in new order and informed Yareli of this. Datalot requests order to be faxed to [phone].

## 2018-07-25 NOTE — TELEPHONE ENCOUNTER
Tried calling patient this morning to find out which number his work note needs to be sent to, but I was unable to reach patient and voicemail box was full. If patient returns call, please find out a fax number for us to send work note.

## 2018-07-25 NOTE — TELEPHONE ENCOUNTER
Rosa Ricks from [facility] needs to discuss patients wound care instructions. She says they were to continue \"packing\" the rt thumb, but they are not able to keep doing so. Please contact Rosa Ricks to discuss as soon as possible at [phone].

## 2018-07-27 ENCOUNTER — HOME CARE VISIT (OUTPATIENT)
Dept: HOME HEALTH SERVICES | Facility: HOME HEALTH | Age: 53
End: 2018-07-27
Payer: OTHER MISCELLANEOUS

## 2018-07-27 ENCOUNTER — HOME CARE VISIT (OUTPATIENT)
Dept: SCHEDULING | Facility: HOME HEALTH | Age: 53
End: 2018-07-27
Payer: OTHER MISCELLANEOUS

## 2018-07-27 VITALS
OXYGEN SATURATION: 98 % | DIASTOLIC BLOOD PRESSURE: 93 MMHG | HEART RATE: 99 BPM | RESPIRATION RATE: 20 BRPM | TEMPERATURE: 97.3 F | SYSTOLIC BLOOD PRESSURE: 126 MMHG

## 2018-07-27 PROCEDURE — G0300 HHS/HOSPICE OF LPN EA 15 MIN: HCPCS

## 2018-07-28 ENCOUNTER — HOME CARE VISIT (OUTPATIENT)
Dept: HOME HEALTH SERVICES | Facility: HOME HEALTH | Age: 53
End: 2018-07-28
Payer: OTHER MISCELLANEOUS

## 2018-07-29 ENCOUNTER — HOME CARE VISIT (OUTPATIENT)
Dept: HOME HEALTH SERVICES | Facility: HOME HEALTH | Age: 53
End: 2018-07-29
Payer: OTHER MISCELLANEOUS

## 2018-07-29 VITALS
TEMPERATURE: 98.6 F | SYSTOLIC BLOOD PRESSURE: 125 MMHG | DIASTOLIC BLOOD PRESSURE: 81 MMHG | HEART RATE: 82 BPM | OXYGEN SATURATION: 100 %

## 2018-07-30 ENCOUNTER — OFFICE VISIT (OUTPATIENT)
Dept: ORTHOPEDIC SURGERY | Age: 53
End: 2018-07-30

## 2018-07-30 VITALS
HEIGHT: 70 IN | HEART RATE: 85 BPM | WEIGHT: 219 LBS | BODY MASS INDEX: 31.35 KG/M2 | OXYGEN SATURATION: 100 % | DIASTOLIC BLOOD PRESSURE: 84 MMHG | SYSTOLIC BLOOD PRESSURE: 122 MMHG

## 2018-07-30 DIAGNOSIS — L02.511 ABSCESS OF THUMB, RIGHT: Primary | ICD-10-CM

## 2018-07-30 DIAGNOSIS — Z98.890 STATUS POST INCISION AND DRAINAGE: ICD-10-CM

## 2018-07-30 NOTE — LETTER
NOTIFICATION RETURN TO WORK / SCHOOL 
 
7/30/2018 3:53 PM 
 
Mr. Fernandez Mckenzie 
[id number] [facility] [address] To Whom It May Concern: 
 
Fernandez Mckenzie is currently under the care of [address] [place] [address] Vinicius Shaikh. He will return to work 8. 1.18 light duty with no lifting with right arm. If there are questions or concerns please have the patient contact our office. Sincerely, ShylaPABLO Kay

## 2018-07-30 NOTE — PROGRESS NOTES
Rita Anderson  1965     HISTORY OF PRESENT ILLNESS  Rita Anderson is a 52 y.o. male who presents today for evaluation s/p right thumb incision and drainage on 7/19/18. He comes in today for follow up. He rates his pain 0/10 today. Notes no pain just some soreness. He is doing well and has been talking his ABX as prescribed. He does not have concerns or questions today. Patient denies any fever, chills, chest pain, shortness of breath or calf pain. There are no new illness or injuries to report since last seen in the office. PHYSICAL EXAM:   Visit Vitals    /84 (BP 1 Location: Left arm, BP Patient Position: Sitting)    Pulse 85    Ht 5' 10\" (1.778 m)    Wt 219 lb (99.3 kg)    SpO2 100%    BMI 31.42 kg/m2      The patient is a well-developed, well-nourished male in no acute distress. The patient is alert and oriented times three. The patient appears to be well groomed. Mood and affect are normal.  ORTHOPEDIC EXAM of right thumb:   Inspection: marked improvement in swelling, no bruising, dressing in place  Incision, clean, dry, intact, sutures in place  Range of motion: able to passively flex thumb at IP joint and MCP  ttp none  Stability: Stable  Strength: N/A    CULTURES:   Wound  Component Results      Component Value Ref Range & Units Status     Special Requests: RIGHT   THUMB      Final     GRAM STAIN FEW WBC'S   Final     GRAM STAIN    Final     RARE GRAM POSITIVE COCCI IN PAIRS     Culture result:    Final     FEW ESCHERICHIA COLI 2 MORPHOTYPES SAME SUSCEPTIBILITY (A)     Culture result:    Final     FEW STAPHYLOCOCCUS LUGDUNENSIS (A)     Culture result:    Final     RARE BACILLUS SPECIES, NOT ANTHRACIS (A)       Tissue  Component Results      Component Value Ref Range & Units Status     Special Requests: RIGHT   THUMB      Final     GRAM STAIN NO WBC'S SEEN   Final     GRAM STAIN NO ORGANISMS SEEN   Final     Culture result: FEW ESCHERICHIA COLI (A)   Final     Culture result:    Final     FEW STAPHYLOCOCCUS LUGDUNENSIS (A)       Anerobic  Component Results      Component Value Ref Range & Units Status     Special Requests: RIGHT   THUMB      Preliminary     Culture result: NO ANAEROBES ISOLATED 4 DAYS   Preliminary               IMPRESSION:      ICD-10-CM ICD-9-CM    1. Abscess of thumb, right L02.511 681.00    2.  Status post incision and drainage Z98.890 V45.89         PLAN:   Pt doing well post operatively  Sutures removed today  Has f/u with ID next week  Return to work light duty no lifting with right hand 8/1/18    RTC 2 weeks    Patient seen and evaluated by Dr. Jessica Nelson today who agrees with treatment plan    Follow-up Disposition: Not on [address], PA-C  [phone] and Spine Specialist

## 2018-08-08 ENCOUNTER — OFFICE VISIT (OUTPATIENT)
Dept: INTERNAL MEDICINE CLINIC | Age: 53
End: 2018-08-08

## 2018-08-08 ENCOUNTER — TELEPHONE (OUTPATIENT)
Dept: ORTHOPEDIC SURGERY | Age: 53
End: 2018-08-08

## 2018-08-08 VITALS
HEART RATE: 85 BPM | SYSTOLIC BLOOD PRESSURE: 116 MMHG | WEIGHT: 215.9 LBS | HEIGHT: 70 IN | RESPIRATION RATE: 16 BRPM | DIASTOLIC BLOOD PRESSURE: 62 MMHG | OXYGEN SATURATION: 100 % | BODY MASS INDEX: 30.91 KG/M2 | TEMPERATURE: 98.3 F

## 2018-08-08 DIAGNOSIS — L02.511 ABSCESS OF RIGHT THUMB: Primary | ICD-10-CM
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
## 2018-08-08 NOTE — LETTER
NOTIFICATION RETURN TO WORK / SCHOOL 
 
8/9/2018 8:27 AM 
 
Mr. Fernandez Mckenzie 
[address] [facility] [address] To Whom It May Concern: 
 
Fernandez Mckenzie is currently under the care of [address] [place] [address] Vinicius Shaikh. He will return to a full duty work status on 8/9/18. If there are questions or concerns please have the patient contact our office. Sincerely, Sami Nobles MD

## 2018-08-08 NOTE — TELEPHONE ENCOUNTER
Pt walked in to the [facility] location requesting his light duty status to be lifted.      Please call luke ANAYA#[phone]

## 2018-08-08 NOTE — PROGRESS NOTES
1. Have you been to the ER, urgent care clinic or hospitalized since your last visit? yes     2. Have you seen or consulted any other health care providers outside of the [address] [place] [address] since your last visit (Include any pap smears or colon screening)? YES      Do you have an Advanced Directive? NO    Would you like information on Advanced Directives?  YES

## 2018-08-08 NOTE — MR AVS SNAPSHOT
Edin Lieberman 
 
 
 [address], [address] [facility] [address] 
[phone] Patient: Carrie Swanson MRN: [id number] :1965 Visit Information Date & Time Provider Department Dept. Phone Encounter #  
 2018 11:00 AM Rafita Monae MD [facility] [id number] Follow-up Instructions Return in about 4 weeks (around 2018). Your Appointments 2018  2:50 PM  
Follow Up with Jed Rhodes MD  
[address] [facility], [address] and [facility] ([address]) Appt Note: 2wk fu  
 [address] [facility] [address]  
[phone] [address] [facility], [address] [place]  
  
    
 2018  2:30 PM  
Office Visit with Rafita Monae MD  
[facility] [address] [facility]) Appt Note: ov 4wks jere  
 [address], [address] [address] [address] [place]  
  
   
 [address] [facility], [address] [place] Upcoming Health Maintenance Date Due Hepatitis C Screening 1965 Pneumococcal 19-64 Medium Risk (1 of 1 - PPSV23) 1982 FOBT Q 1 YEAR AGE 50-75 2013 Influenza Age 5 to Adult 2018 DTaP/Tdap/Td series (2 - Td) 10/26/2023 Allergies as of 2018  Review Complete On: 2018 By: Rafita Monae MD  
 No Known Allergies Current Immunizations  Never Reviewed Name Date Tdap 10/26/2013  2:08 AM  
  
 Not reviewed this visit You Were Diagnosed With   
  
 Codes Comments Abscess of right thumb    -  Primary ICD-10-CM: L02.511 ICD-9-CM: 681.00 Vitals BP Pulse Temp Resp Height(growth percentile) Weight(growth percentile) 116/62 (BP 1 Location: Left arm, BP Patient Position: Sitting) 85 98.3 °F (36.8 °C) (Oral) 16 5' 10\" (1.778 m) 215 lb 14.4 oz (97.9 kg) SpO2 BMI Smoking Status 100% 30.98 kg/m2 Current Every Day Smoker Vitals History BMI and BSA Data Body Mass Index Body Surface Area 30.98 kg/m 2 2.2 m 2 Your Updated Medication List  
  
   
This list is accurate as of 8/8/18 11:07 AM.  Always use your most recent med list.  
  
  
  
  
 amoxicillin-clavulanate 875-125 mg per tablet Commonly known as:  AUGMENTIN Take 1 Tab by mouth two (2) times a day. colchicine 0.6 mg tablet Take 0.6 mg by mouth two (2) times daily as needed (Gout pain). ibuprofen 800 mg tablet Commonly known as:  MOTRIN Take 800 mg by mouth every six (6) hours as needed for Pain. lisinopril-hydroCHLOROthiazide 10-12.5 mg per tablet Commonly known as:  Massimo Notice Take 1 Tab by mouth daily. trimethoprim-sulfamethoxazole 160-800 mg per tablet Commonly known as:  BACTRIM DS, SEPTRA DS Take 1 Tab by mouth two (2) times a day. Follow-up Instructions Return in about 4 weeks (around 9/5/2018). Patient Instructions Advance Directives: Care Instructions Your Care Instructions An advance directive is a legal way to state your wishes at the end of your life. It tells your family and your doctor what to do if you can no longer say what you want. There are two main types of advance directives. You can change them any time that your wishes change. · A living will tells your family and your doctor your wishes about life support and other treatment. · A durable power of  for health care lets you name a person to make treatment decisions for you when you can't speak for yourself. This person is called a health care agent. If you do not have an advance directive, decisions about your medical care may be made by a doctor or a  who doesn't know you. It may help to think of an advance directive as a gift to the people who care for you. If you have one, they won't have to make tough decisions by themselves. Follow-up care is a key part of your treatment and safety. Be sure to make and go to all appointments, and call your doctor if you are having problems. It's also a good idea to know your test results and keep a list of the medicines you take. How can you care for yourself at home? · Discuss your wishes with your loved ones and your doctor. This way, there are no surprises. · Many states have a unique form. Or you might use a universal form that has been approved by many states. This kind of form can sometimes be completed and stored online. Your electronic copy will then be available wherever you have a connection to the Internet. In most cases, doctors will respect your wishes even if you have a form from a different state. · You don't need a  to do an advance directive. But you may want to get legal advice. · Think about these questions when you prepare an advance directive: ¨ Who do you want to make decisions about your medical care if you are not able to? Many people choose a family member or close friend. ¨ Do you know enough about life support methods that might be used? If not, talk to your doctor so you understand. ¨ What are you most afraid of that might happen? You might be afraid of having pain, losing your independence, or being kept alive by machines. ¨ Where would you prefer to die? Choices include your home, a hospital, or a nursing home. ¨ Would you like to have information about hospice care to support you and your family? ¨ Do you want to donate organs when you die? ¨ Do you want certain Restorationism practices performed before you die? If so, put your wishes in the advance directive. · Read your advance directive every year, and make changes as needed. When should you call for help? Be sure to contact your doctor if you have any questions. Where can you learn more? Go to http://mae-rakan.info/. Enter R264 in the search box to learn more about \"Advance Directives: Care Instructions. \" Current as of: October 6, 2017 Content Version: 11.7 © 4981-4688 [employer]. Care instructions adapted under license by [employer] (which disclaims liability or warranty for this information). If you have questions about a medical condition or this instruction, always ask your healthcare professional. [address] any warranty or liability for your use of this information. Introducing [facility]! [employer] introduces watAgame patient portal. Now you can access parts of your medical record, email your doctor's office, and request medication refills online. 1. In your internet browser, go to [email or web address] 2. Click on the First Time User? Click Here link in the Sign In box. You will see the New Member Sign Up page. 3. Enter your watAgame Access Code exactly as it appears below. You will not need to use this code after youve completed the sign-up process. If you do not sign up before the expiration date, you must request a new code. · watAgame Access Code: [email or web address] Expires: 11/4/2018  5:19 AM 
 
4. Enter the last four digits of your Social Security Number (xxxx) and Date of Birth (mm/dd/yyyy) as indicated and click Submit. You will be taken to the next sign-up page. 5. Create a watAgame ID. This will be your watAgame login ID and cannot be changed, so think of one that is secure and easy to remember. 6. Create a watAgame password. You can change your password at any time. 7. Enter your Password Reset Question and Answer. This can be used at a later time if you forget your password. 8. Enter your e-mail address. You will receive e-mail notification when new information is available in [address] 9. Click Sign Up. You can now view and download portions of your medical record. 10. Click the Download Summary menu link to download a portable copy of your medical information. If you have questions, please visit the Frequently Asked Questions section of the [employer] website. Remember, [employer] is NOT to be used for urgent needs. For medical emergencies, dial 911. Now available from your iPhone and Android! Please provide this summary of care documentation to your next provider. Your primary care clinician is listed as Ananya. If you have any questions after today's visit, please call [phone].

## 2018-08-08 NOTE — PATIENT INSTRUCTIONS
Advance Directives: Care Instructions  Your Care Instructions  An advance directive is a legal way to state your wishes at the end of your life. It tells your family and your doctor what to do if you can no longer say what you want. There are two main types of advance directives. You can change them any time that your wishes change. · A living will tells your family and your doctor your wishes about life support and other treatment. · A durable power of  for health care lets you name a person to make treatment decisions for you when you can't speak for yourself. This person is called a health care agent. If you do not have an advance directive, decisions about your medical care may be made by a doctor or a  who doesn't know you. It may help to think of an advance directive as a gift to the people who care for you. If you have one, they won't have to make tough decisions by themselves. Follow-up care is a key part of your treatment and safety. Be sure to make and go to all appointments, and call your doctor if you are having problems. It's also a good idea to know your test results and keep a list of the medicines you take. How can you care for yourself at home? · Discuss your wishes with your loved ones and your doctor. This way, there are no surprises. · Many states have a unique form. Or you might use a universal form that has been approved by many states. This kind of form can sometimes be completed and stored online. Your electronic copy will then be available wherever you have a connection to the Internet. In most cases, doctors will respect your wishes even if you have a form from a different state. · You don't need a  to do an advance directive. But you may want to get legal advice. · Think about these questions when you prepare an advance directive:  ¨ Who do you want to make decisions about your medical care if you are not able to?  Many people choose a family member or close friend. ¨ Do you know enough about life support methods that might be used? If not, talk to your doctor so you understand. ¨ What are you most afraid of that might happen? You might be afraid of having pain, losing your independence, or being kept alive by machines. ¨ Where would you prefer to die? Choices include your home, a hospital, or a nursing home. ¨ Would you like to have information about hospice care to support you and your family? ¨ Do you want to donate organs when you die? ¨ Do you want certain Scientologist practices performed before you die? If so, put your wishes in the advance directive. · Read your advance directive every year, and make changes as needed. When should you call for help? Be sure to contact your doctor if you have any questions. Where can you learn more? Go to http://mae-rakan.info/. Enter R264 in the search box to learn more about \"Advance Directives: Care Instructions. \"  Current as of: October 6, 2017  Content Version: 11.7  © 0165-8737 [employer]. Care instructions adapted under license by [employer] (which disclaims liability or warranty for this information). If you have questions about a medical condition or this instruction, always ask your healthcare professional. [address] any warranty or liability for your use of this information.

## 2018-08-09 NOTE — TELEPHONE ENCOUNTER
Spoke with patient and informed him that his work note is ready to be picked up at the [facility] location. Patient verbalized understanding.

## 2018-08-11 VITALS
RESPIRATION RATE: 18 BRPM | SYSTOLIC BLOOD PRESSURE: 126 MMHG | TEMPERATURE: 97.7 F | HEART RATE: 99 BPM | DIASTOLIC BLOOD PRESSURE: 93 MMHG | OXYGEN SATURATION: 98 %

## 2018-11-19 ENCOUNTER — OFFICE VISIT (OUTPATIENT)
Dept: ORTHOPEDIC SURGERY | Age: 53
End: 2018-11-19

## 2018-11-19 VITALS
RESPIRATION RATE: 16 BRPM | DIASTOLIC BLOOD PRESSURE: 91 MMHG | HEART RATE: 91 BPM | OXYGEN SATURATION: 99 % | HEIGHT: 70 IN | BODY MASS INDEX: 31.21 KG/M2 | SYSTOLIC BLOOD PRESSURE: 150 MMHG | WEIGHT: 218 LBS | TEMPERATURE: 96.2 F

## 2018-11-19 DIAGNOSIS — L02.511 ABSCESS OF THUMB, RIGHT: Primary | ICD-10-CM

## 2018-11-19 DIAGNOSIS — Z98.890 STATUS POST INCISION AND DRAINAGE: ICD-10-CM

## 2018-11-19 NOTE — PROGRESS NOTES
Maribell Prince 1965 HISTORY OF PRESENT ILLNESS 
Maribell Prince is a 52 y.o. male who presents today for evaluation s/p right thumb incision and drainage on 7/19/18. He comes in today for follow up. He rates his pain 0/10 today. Notes no pain just some soreness. He is doing well and has been talking his ABX as prescribed. He does not have concerns or questions today. He has some disfiguring of the nailbed. Patient denies any fever, chills, chest pain, shortness of breath or calf pain. The remainder of the review of systems is negative. There are no new illness or injuries to report since last seen in the office. There are no changes to medications, allergies, family or social history. PHYSICAL EXAM:  
Visit Vitals BP (!) 150/91 Pulse 91 Temp 96.2 °F (35.7 °C) (Oral) Resp 16 Ht 5' 10\" (1.778 m) Wt 218 lb (98.9 kg) SpO2 99% BMI 31.28 kg/m² The patient is a well-developed, well-nourished male in no acute distress. The patient is alert and oriented times three. The patient appears to be well groomed. Mood and affect are normal. 
LYMPHATIC: lymph nodes are not enlarged and are within normal limits SKIN: normal in color and non tender to palpation. There are no bruises or abrasions noted. NEUROLOGICAL: Motor sensory exam is within normal limits. Reflexes are equal bilaterally. There is normal sensation to pinprick and light touch ORTHOPEDIC EXAM of right thumb: Inspection: marked improvement in swelling, no bruising, dressing in place Incision, clean, dry, intact, sutures in place Range of motion: able to passively flex thumb at IP joint and MCP 
ttp none Stability: Stable Strength: N/A 
 
CULTURES:  
Wound Component Results   
  Component Value Ref Range & Units Status  
  Special Requests: RIGHT  
THUMB  
   Final  
  GRAM STAIN FEW WBC'S   Final  
  GRAM STAIN    Final  
  RARE GRAM POSITIVE COCCI IN PAIRS  
  Culture result:    Final  
   FEW ESCHERICHIA COLI 2 MORPHOTYPES SAME SUSCEPTIBILITY (A)  
  Culture result:    Final  
  FEW STAPHYLOCOCCUS LUGDUNENSIS (A)  
  Culture result:    Final  
  RARE BACILLUS SPECIES, NOT ANTHRACIS (A)  
  Tissue Component Results   
  Component Value Ref Range & Units Status  
  Special Requests: RIGHT  
THUMB  
   Final  
  GRAM STAIN NO WBC'S SEEN   Final  
  GRAM STAIN NO ORGANISMS SEEN   Final  
  Culture result: FEW ESCHERICHIA COLI (A)   Final  
  Culture result:    Final  
  FEW STAPHYLOCOCCUS LUGDUNENSIS (A)  
   
Anerobic Component Results   
  Component Value Ref Range & Units Status  
  Special Requests: RIGHT  
THUMB  
   Preliminary  
  Culture result: NO ANAEROBES ISOLATED 4 DAYS   Preliminary  
   
 
 
IMPRESSION:   
  ICD-10-CM ICD-9-CM 1. Abscess of thumb, right L02.511 681.00   
2. Status post incision and drainage Z98.890 V45.89 PLAN:  
Pt doing well post operatively Return to work light duty no lifting with right hand 8/1/18 I expect the nail to continue to grow out and heal. 
 
RTC prn Patient seen and evaluated by Dr. Alyssa Hastings today who agrees with treatment plan Follow-up Disposition: Not on File Scribed by Winston Allen ([facility]) as dictated by Zee Rivera MD 
 
I, Dr. Zee Rivera, confirm that all documentation is accurate.  
 
Zee Rivera M.D.  
[facility]

## 2019-02-14 ENCOUNTER — HOSPITAL ENCOUNTER (OUTPATIENT)
Dept: LAB | Age: 54
Discharge: HOME | End: 2019-02-14
Payer: MEDICAID

## 2019-02-14 LAB
ALBUMIN SERPL-MCNC: 3.5 G/DL (ref 3.4–5)
ALBUMIN/GLOB SERPL: 1.2 {RATIO} (ref 0.8–1.7)
ALP SERPL-CCNC: 167 U/L (ref 45–117)
ALT SERPL-CCNC: 28 U/L (ref 16–61)
ANION GAP SERPL CALC-SCNC: 7 MMOL/L (ref 3–18)
AST SERPL-CCNC: 12 U/L (ref 15–37)
BASOPHILS # BLD: 0 K/UL (ref 0–0.1)
BASOPHILS NFR BLD: 0 % (ref 0–2)
BILIRUB SERPL-MCNC: 0.2 MG/DL (ref 0.2–1)
BUN SERPL-MCNC: 25 MG/DL (ref 7–18)
BUN/CREAT SERPL: 23 (ref 12–20)
CALCIUM SERPL-MCNC: 8.8 MG/DL (ref 8.5–10.1)
CHLORIDE SERPL-SCNC: 109 MMOL/L (ref 100–108)
CHOLEST SERPL-MCNC: 167 MG/DL
CO2 SERPL-SCNC: 26 MMOL/L (ref 21–32)
CREAT SERPL-MCNC: 1.07 MG/DL (ref 0.6–1.3)
DIFFERENTIAL METHOD BLD: ABNORMAL
EOSINOPHIL # BLD: 0.3 K/UL (ref 0–0.4)
EOSINOPHIL NFR BLD: 4 % (ref 0–5)
ERYTHROCYTE [DISTWIDTH] IN BLOOD BY AUTOMATED COUNT: 13.6 % (ref 11.6–14.5)
GLOBULIN SER CALC-MCNC: 2.9 G/DL (ref 2–4)
GLUCOSE SERPL-MCNC: 98 MG/DL (ref 74–99)
HCT VFR BLD AUTO: 39.2 % (ref 36–48)
HDLC SERPL-MCNC: 40 MG/DL (ref 40–60)
HDLC SERPL: 4.2 {RATIO} (ref 0–5)
HGB BLD-MCNC: 13.6 G/DL (ref 13–16)
LDLC SERPL CALC-MCNC: 104 MG/DL (ref 0–100)
LIPID PROFILE,FLP: ABNORMAL
LYMPHOCYTES # BLD: 2.7 K/UL (ref 0.9–3.6)
LYMPHOCYTES NFR BLD: 40 % (ref 21–52)
MCH RBC QN AUTO: 31.1 PG (ref 24–34)
MCHC RBC AUTO-ENTMCNC: 34.7 G/DL (ref 31–37)
MCV RBC AUTO: 89.7 FL (ref 74–97)
MONOCYTES # BLD: 0.7 K/UL (ref 0.05–1.2)
MONOCYTES NFR BLD: 10 % (ref 3–10)
NEUTS SEG # BLD: 3.2 K/UL (ref 1.8–8)
NEUTS SEG NFR BLD: 46 % (ref 40–73)
PLATELET # BLD AUTO: 274 K/UL (ref 135–420)
PMV BLD AUTO: 9.9 FL (ref 9.2–11.8)
POTASSIUM SERPL-SCNC: 4.3 MMOL/L (ref 3.5–5.5)
PROT SERPL-MCNC: 6.4 G/DL (ref 6.4–8.2)
PSA SERPL-MCNC: 1.5 NG/ML (ref 0–4)
RBC # BLD AUTO: 4.37 M/UL (ref 4.7–5.5)
SODIUM SERPL-SCNC: 142 MMOL/L (ref 136–145)
T4 SERPL-MCNC: 8.5 UG/DL (ref 4.7–13.3)
TRIGL SERPL-MCNC: 115 MG/DL (ref ?–150)
TSH SERPL DL<=0.05 MIU/L-ACNC: 0.78 UIU/ML (ref 0.36–3.74)
VLDLC SERPL CALC-MCNC: 23 MG/DL
WBC # BLD AUTO: 6.8 K/UL (ref 4.6–13.2)

## 2019-02-14 PROCEDURE — 36415 COLL VENOUS BLD VENIPUNCTURE: CPT

## 2019-02-14 PROCEDURE — 84436 ASSAY OF TOTAL THYROXINE: CPT

## 2019-02-14 PROCEDURE — 85025 COMPLETE CBC W/AUTO DIFF WBC: CPT

## 2019-02-14 PROCEDURE — 80053 COMPREHEN METABOLIC PANEL: CPT

## 2019-02-14 PROCEDURE — 80061 LIPID PANEL: CPT

## 2019-02-14 PROCEDURE — 84153 ASSAY OF PSA TOTAL: CPT

## 2019-07-30 ENCOUNTER — OFFICE VISIT (OUTPATIENT)
Dept: ORTHOPEDIC SURGERY | Age: 54
End: 2019-07-30

## 2019-07-30 VITALS
RESPIRATION RATE: 18 BRPM | SYSTOLIC BLOOD PRESSURE: 159 MMHG | BODY MASS INDEX: 35.33 KG/M2 | HEART RATE: 92 BPM | OXYGEN SATURATION: 96 % | TEMPERATURE: 96.9 F | WEIGHT: 246.8 LBS | DIASTOLIC BLOOD PRESSURE: 105 MMHG | HEIGHT: 70 IN

## 2019-07-30 DIAGNOSIS — M17.11 PRIMARY OSTEOARTHRITIS OF RIGHT KNEE: Primary | ICD-10-CM

## 2019-07-30 DIAGNOSIS — M25.561 RIGHT KNEE PAIN, UNSPECIFIED CHRONICITY: ICD-10-CM

## 2019-07-30 DIAGNOSIS — M16.12 ARTHRITIS OF LEFT HIP: ICD-10-CM

## 2019-07-30 DIAGNOSIS — M25.552 LEFT HIP PAIN: ICD-10-CM

## 2019-07-30 PROBLEM — E66.01 SEVERE OBESITY (HCC): Status: ACTIVE | Noted: 2019-07-30

## 2019-07-30 RX ORDER — TRIAMCINOLONE ACETONIDE 40 MG/ML
40 INJECTION, SUSPENSION INTRA-ARTICULAR; INTRAMUSCULAR ONCE
Qty: 1 ML | Refills: 0
Start: 2019-07-30 | End: 2019-07-30

## 2019-07-30 NOTE — PROGRESS NOTES
Fiona  1965   Chief Complaint   Patient presents with    Knee Pain     RIGHT KNEE PAIN    Hip Pain     LEFT HIP PAIN        HISTORY OF PRESENT ILLNESS  Fiona is a 62 y.o. male who presents today for reevaluation of right knee pain. Patient rates pain as 7/10 today. Pain has been present for two months. Associated symptom of swelling. Additional complaint of left hip pain. Pt states knee pain is worse. Patient denies any fever, chills, chest pain, shortness of breath or calf pain. The remainder of the review of systems is negative. There are no new illness or injuries to report since last seen in the office. There are no changes to medications, allergies, family or social history. Pain Assessment  7/30/2019   Location of Pain Knee;Hip   Pain Location Comment -   Location Modifiers Right;Left   Severity of Pain 7   Quality of Pain Aching; Sonda Fluke; Throbbing;Dull;Burning   Quality of Pain Comment -   Duration of Pain Persistent   Duration of Pain Comment -   Frequency of Pain Constant   Aggravating Factors Walking;Standing   Aggravating Factors Comment -   Limiting Behavior -   Relieving Factors Rest;NSAID   Relieving Factors Comment -   Result of Injury No   Work-Related Injury -   How long out of work? -   Type of Injury -   Type of Injury Comment -     PHYSICAL EXAM:   Visit Vitals  BP (!) 159/105   Pulse 92   Temp 96.9 °F (36.1 °C) (Oral)   Resp 18   Ht 5' 10\" (1.778 m)   Wt 246 lb 12.8 oz (111.9 kg)   SpO2 96%   BMI 35.41 kg/m²     The patient is a well-developed, well-nourished male   in no acute distress. The patient is alert and oriented times three. The patient is alert and oriented times three. Mood and affect are normal.  LYMPHATIC: lymph nodes are not enlarged and are within normal limits  SKIN: normal in color and non tender to palpation. There are no bruises or abrasions noted. NEUROLOGICAL: Motor sensory exam is within normal limits. Reflexes are equal bilaterally. There is normal sensation to pinprick and light touch  MUSCULOSKELETAL:  Examination Right knee   Skin Intact   Range of motion 0-130   Effusion +   Medial joint line tenderness +   Lateral joint line tenderness +   Tenderness Pes Bursa -   Tenderness insertion MCL -   Tenderness insertion LCL -   Teres -   Patella crepitus +   Patella grind +   Lachman -   Pivot shift -   Anterior drawer -   Posterior drawer -   Varus stress -   Valgus stress -   Neurovascular Intact   Calf Swelling and Tenderness to Palpation -   Ying's Test -   Hamstring Cord Tightness -        Examination Left hip   Skin Intact   Flexion ROM 80   Extension ROM 0   External Rotation ROM 10   Internal Rotation ROM 10   Abduction ROM 20   Adduction ROM 20   FADDIR +   FRANCI +   Log roll test -   Trochanteric tenderness -   Fabricio test -   Neurovascular Intact     PROCEDURE: Right Knee Injection with Ultrasound Guidance  Indication:Right Knee pain/swelling    After sterile prep, 4 cc of Xylocaine and 1 cc of Kenalog were injected into the right knee. Ultrasound images captured using [facility] Ultrasound machine and scanned into patient's chart.        [facility]  OFFICE PROCEDURE PROGRESS NOTE        Chart reviewed for the following:  Iris López M.D, have reviewed the History, Physical and updated the Allergic reactions for [address] [place] [address] performed immediately prior to start of procedure:  Iris López M.D, have performed the following reviews on [facility] prior to the start of the procedure:            * Patient was identified by name and date of birth   * Agreement on procedure being performed was verified  * Risks and Benefits explained to the patient  * Procedure site verified and marked as necessary  * Patient was positioned for comfort  * Consent was signed and verified     Time: 9:42 AM     Date of procedure: 7/30/2019    Procedure performed by: Kelle Damico M.D    Provider assisted by: (see medication administration)    How tolerated by patient: tolerated the procedure well with no complications    Comments: none      IMAGING: XR of right knee dated 7/30/19 was reviewed and read: Marked decreased joint space medially with degenerative changes in patellofemoral joint, moderate in severity    XR of left hip dated 7/30/19 was reviewed and read:   Subchondral cysts and changes in acetabular femoral head bilaterally, worse on left      IMPRESSION:      ICD-10-CM ICD-9-CM    1. Primary osteoarthritis of right knee M17.11 715.16    2. Right knee pain, unspecified chronicity M25.561 719.46 AMB POC XRAY, KNEE; 1/2 VIEWS   3. Left hip pain M25.552 719.45 AMB POC X-RAY RADEX HIP UNI WITH PELVIS 2-3 VIEWS   4. Arthritis of left hip M16.12 716.95         PLAN:   1. Pt presents today with right knee and left hip pain due to OA and I would like to try an injection in the knee today and schedule an injection for the hip at the hospital.  Risk factors include: htn  2. No ultrasound exam indicated today  3. Yes cortisone injection indicated today R KNEE US & L HIP FLUORO   4. No Physical/Occupational Therapy indicated today  5. No diagnostic test indicated today:   6. No durable medical equipment indicated today  7. No referral indicated today   8. No medications indicated today:   9. No Narcotic indicated today       RTC 3 weeks if pain continues      Scribed by Yelena Walden) as dictated by Kelle Damico MD    I, Dr. Kelle Damico, confirm that all documentation is accurate.     Kelle Damico M.D.   [address] [place] [address] and Spine Specialist

## 2019-07-30 NOTE — PROGRESS NOTES
1. Have you been to the ER, urgent care clinic since your last visit? Hospitalized since your last visit? No    2. Have you seen or consulted any other health care providers outside of the [address] [place] [address] since your last visit? Include any pap smears or colon screening.  No

## 2019-08-19 ENCOUNTER — HOSPITAL ENCOUNTER (OUTPATIENT)
Dept: GENERAL RADIOLOGY | Age: 54
Discharge: HOME | End: 2019-08-19
Attending: ORTHOPAEDIC SURGERY
Payer: MEDICAID

## 2019-08-19 DIAGNOSIS — M25.552 LEFT HIP PAIN: ICD-10-CM

## 2019-08-19 DIAGNOSIS — M16.12 ARTHRITIS OF LEFT HIP: ICD-10-CM

## 2019-08-19 PROCEDURE — 74011250636 HC RX REV CODE- 250/636: Performed by: ORTHOPAEDIC SURGERY

## 2019-08-19 PROCEDURE — 77002 NEEDLE LOCALIZATION BY XRAY: CPT

## 2019-08-19 PROCEDURE — 74011636320 HC RX REV CODE- 636/320: Performed by: ORTHOPAEDIC SURGERY

## 2019-08-19 RX ORDER — METHYLPREDNISOLONE ACETATE 40 MG/ML
40 INJECTION, SUSPENSION INTRA-ARTICULAR; INTRALESIONAL; INTRAMUSCULAR; SOFT TISSUE
Status: COMPLETED | OUTPATIENT
Start: 2019-08-19 | End: 2019-08-19

## 2019-08-19 RX ORDER — LIDOCAINE HYDROCHLORIDE 10 MG/ML
30 INJECTION, SOLUTION EPIDURAL; INFILTRATION; INTRACAUDAL; PERINEURAL
Status: COMPLETED | OUTPATIENT
Start: 2019-08-19 | End: 2019-08-19

## 2019-08-19 RX ADMIN — LIDOCAINE HYDROCHLORIDE 30 ML: 10 INJECTION, SOLUTION EPIDURAL; INFILTRATION; INTRACAUDAL; PERINEURAL at 13:30

## 2019-08-19 RX ADMIN — IOPAMIDOL 20 ML: 408 INJECTION, SOLUTION INTRATHECAL at 13:30

## 2019-08-19 RX ADMIN — METHYLPREDNISOLONE ACETATE 40 MG: 40 INJECTION, SUSPENSION INTRA-ARTICULAR; INTRALESIONAL; INTRAMUSCULAR; SOFT TISSUE at 13:30

## 2019-09-03 ENCOUNTER — OFFICE VISIT (OUTPATIENT)
Dept: ORTHOPEDIC SURGERY | Age: 54
End: 2019-09-03

## 2019-09-03 VITALS
SYSTOLIC BLOOD PRESSURE: 155 MMHG | BODY MASS INDEX: 34.65 KG/M2 | OXYGEN SATURATION: 97 % | HEIGHT: 70 IN | TEMPERATURE: 97.6 F | RESPIRATION RATE: 16 BRPM | WEIGHT: 242 LBS | HEART RATE: 100 BPM | DIASTOLIC BLOOD PRESSURE: 100 MMHG

## 2019-09-03 DIAGNOSIS — M17.11 PRIMARY OSTEOARTHRITIS OF RIGHT KNEE: Primary | ICD-10-CM

## 2019-09-03 NOTE — PROGRESS NOTES
Man Thrasher  1965   Chief Complaint   Patient presents with    Knee Pain     right knee f/u         HISTORY OF PRESENT ILLNESS  Man Thrasher is a 62 y.o. male who presents today for reevaluation of right knee pain. Patient rates pain as 8/10 today. Pain has been present for a couple of months. Associated symptom of swelling. At last OV, patient had a cortisone injection in the knee which did not provide lasting relief. Patient denies any fever, chills, chest pain, shortness of breath or calf pain. The remainder of the review of systems is negative. There are no new illness or injuries to report since last seen in the office. There are no changes to medications, allergies, family or social history. Pain Assessment  9/3/2019   Location of Pain Knee   Pain Location Comment -   Location Modifiers Right   Severity of Pain 8   Quality of Pain Sharp;Dull;Aching;Burning   Quality of Pain Comment -   Duration of Pain Persistent   Duration of Pain Comment -   Frequency of Pain Constant   Aggravating Factors Standing;Walking   Aggravating Factors Comment -   Limiting Behavior Some   Relieving Factors Rest;Elevation   Relieving Factors Comment -   Result of Injury Yes   Work-Related Injury No   How long out of work? -   Type of Injury Other (Comment)   Type of Injury Comment -     PHYSICAL EXAM:   Visit Vitals  BP (!) 155/100   Pulse 100   Temp 97.6 °F (36.4 °C) (Oral)   Resp 16   Ht 5' 10\" (1.778 m)   Wt 242 lb (109.8 kg)   SpO2 97%   BMI 34.72 kg/m²     The patient is a well-developed, well-nourished male   in no acute distress. The patient is alert and oriented times three. The patient is alert and oriented times three. Mood and affect are normal.  LYMPHATIC: lymph nodes are not enlarged and are within normal limits  SKIN: normal in color and non tender to palpation. There are no bruises or abrasions noted. NEUROLOGICAL: Motor sensory exam is within normal limits. Reflexes are equal bilaterally. There is normal sensation to pinprick and light touch  MUSCULOSKELETAL:  Examination Right knee   Skin Intact   Range of motion 0-130   Effusion +   Medial joint line tenderness +   Lateral joint line tenderness +   Tenderness Pes Bursa -   Tenderness insertion MCL -   Tenderness insertion LCL -   Teres -   Patella crepitus +   Patella grind +   Lachman -   Pivot shift -   Anterior drawer -   Posterior drawer -   Varus stress -   Valgus stress -   Neurovascular Intact   Calf Swelling and Tenderness to Palpation -   Ying's Test -   Hamstring Cord Tightness -        Examination Left hip   Skin Intact   Flexion ROM 80   Extension ROM 0   External Rotation ROM 10   Internal Rotation ROM 10   Abduction ROM 20   Adduction ROM 20   FADDIR +   FRANCI +   Log roll test -   Trochanteric tenderness -   Fabricio test -   Neurovascular Intact       IMAGING: XR of right knee dated 7/30/19 was reviewed and read: Marked decreased joint space medially with degenerative changes in patellofemoral joint, moderate in severity    XR of left hip dated 7/30/19 was reviewed and read:   Subchondral cysts and changes in acetabular femoral head bilaterally, worse on left      IMPRESSION:      ICD-10-CM ICD-9-CM    1. Primary osteoarthritis of right knee M17.11 715.16 MRI KNEE RT WO CONT        PLAN:   1. Pt presents today with right knee pain due to primary OA that was not helped by the cortisone injection given at last OV and I would like to get an MRI of the knee. Risk factors include: htn  2. No ultrasound exam indicated today  3. No cortisone injection indicated today  4. No Physical/Occupational Therapy indicated today  5. Yes diagnostic test indicated today: MRI R KNEE  6. No durable medical equipment indicated today  7. No referral indicated today   8. No medications indicated today:   9.  No Narcotic indicated today       RTC following MRI      Scribed by Kerry Murillo Courser) as dictated by Kyrie iSmms MD    I, Dr. Alfredo Mclaughlin, confirm that all documentation is accurate.     Alfredo Mclaughlin M.D.   [facility] and [facility]ine Specialist

## 2019-09-25 ENCOUNTER — HOSPITAL ENCOUNTER (OUTPATIENT)
Dept: MRI IMAGING | Age: 54
Discharge: HOME | End: 2019-09-25
Attending: ORTHOPAEDIC SURGERY
Payer: MEDICAID

## 2019-09-25 DIAGNOSIS — M17.11 PRIMARY OSTEOARTHRITIS OF RIGHT KNEE: ICD-10-CM

## 2019-09-25 PROCEDURE — 73721 MRI JNT OF LWR EXTRE W/O DYE: CPT

## 2019-10-01 ENCOUNTER — OFFICE VISIT (OUTPATIENT)
Dept: ORTHOPEDIC SURGERY | Age: 54
End: 2019-10-01

## 2019-10-01 VITALS
HEIGHT: 70 IN | SYSTOLIC BLOOD PRESSURE: 134 MMHG | DIASTOLIC BLOOD PRESSURE: 84 MMHG | TEMPERATURE: 97.1 F | OXYGEN SATURATION: 100 % | WEIGHT: 244 LBS | BODY MASS INDEX: 34.93 KG/M2 | HEART RATE: 91 BPM

## 2019-10-01 DIAGNOSIS — M17.11 PRIMARY OSTEOARTHRITIS OF RIGHT KNEE: ICD-10-CM

## 2019-10-01 DIAGNOSIS — S83.241A TEAR OF MEDIAL MENISCUS OF RIGHT KNEE, CURRENT, UNSPECIFIED TEAR TYPE, INITIAL ENCOUNTER: Primary | ICD-10-CM

## 2019-10-01 NOTE — PROGRESS NOTES
Linda Walsh  1965   Chief Complaint   Patient presents with    Knee Pain     right        HISTORY OF PRESENT ILLNESS  Linda Walsh is a 62 y.o. male who presents today for reevaluation of right knee pain and MRI review. Patient rates pain as 7/10 today. Pain has been present for a couple of months. The patient has had a cortisone injection in the knee which did not provide lasting relief. Pt notes pain with prolonged sitting and driving. Pt reports a popping and giving way sensation when walking. Patient denies any fever, chills, chest pain, shortness of breath or calf pain. The remainder of the review of systems is negative. There are no new illness or injuries to report since last seen in the office. There are no changes to medications, allergies, family or social history. Pain Assessment  10/1/2019   Location of Pain Knee   Pain Location Comment -   Location Modifiers Left   Severity of Pain 7   Quality of Pain Aching; Sharp   Quality of Pain Comment -   Duration of Pain -   Duration of Pain Comment -   Frequency of Pain Constant   Aggravating Factors Standing;Walking   Aggravating Factors Comment -   Limiting Behavior Yes   Relieving Factors Nothing   Relieving Factors Comment -   Result of Injury Yes   Work-Related Injury Yes   How long out of work? -   Type of Injury Other (Comment)   Type of Injury Comment -     PHYSICAL EXAM:   Visit Vitals  /84   Pulse 91   Temp 97.1 °F (36.2 °C) (Oral)   Ht 5' 10\" (1.778 m)   Wt 244 lb (110.7 kg)   SpO2 100%   BMI 35.01 kg/m²     The patient is a well-developed, well-nourished male   in no acute distress. The patient is alert and oriented times three. The patient is alert and oriented times three. Mood and affect are normal.  LYMPHATIC: lymph nodes are not enlarged and are within normal limits  SKIN: normal in color and non tender to palpation. There are no bruises or abrasions noted. NEUROLOGICAL: Motor sensory exam is within normal limits. Reflexes are equal bilaterally. There is normal sensation to pinprick and light touch  MUSCULOSKELETAL:  Examination Right knee   Skin Intact   Range of motion 0-130   Effusion +   Medial joint line tenderness +   Lateral joint line tenderness +   Tenderness Pes Bursa -   Tenderness insertion MCL -   Tenderness insertion LCL -   Teres -   Patella crepitus +   Patella grind +   Lachman -   Pivot shift -   Anterior drawer -   Posterior drawer -   Varus stress -   Valgus stress -   Neurovascular Intact   Calf Swelling and Tenderness to Palpation -   Ying's Test -   Hamstring Cord Tightness -        IMAGING: MRI of right knee dated 9/25/19 was reviewed and read:   IMPRESSION:  1. Advanced arthritic findings of the right knee, most pronounced within the medial compartment with extensive full-thickness cartilage loss. 2. Tearing of the medial meniscus involving its body and posterior horn as characterized above. 3. ACL ganglion morphology. 4. Small to moderate joint effusion with an element of synovitis likely secondary to the constellation of the above delineated findings. XR of right knee dated 7/30/19 was reviewed and read: Marked decreased joint space medially with degenerative changes in patellofemoral joint, moderate in severity      IMPRESSION:      ICD-10-CM ICD-9-CM    1. Tear of medial meniscus of right knee, current, unspecified tear type, initial encounter S83.241A 836.0    2. Primary osteoarthritis of right knee M17.11 715.16         PLAN:   1. Pt presents today with right knee pain due to an MRI-documented medial meniscus tear and primary OA. I discussed the risks and benefits and potential adverse outcomes of both operative vs non operative treatment of right medial meniscus tear with the patient. Patient wishes to proceed with arthroscopic right partial medial menisectomy.      Risks of operative intervention include but not limited to bleeding, infection, deep vein thrombosis, pulmonary embolism, death, limb length discrepancy, reflexive sympathetic dystrophy, fat embolism syndrome,damage to blood vessels and nerves, malunion, non-union, delayed union, failure of hardware, post traumatic arthritis, stroke, heart attack, and death. Patient understands that infection may arise and may require numerous surgeries. History and physical exam scheduled for a later date. Risk factors include: htn, weight, osteoarthritis  2. No ultrasound exam indicated today  3. No cortisone injection indicated today  4. No Physical/Occupational Therapy indicated today  5. No diagnostic test indicated today  6. No durable medical equipment indicated today  7. No referral indicated today   8. No medications indicated today:   9. No Narcotic indicated today       RTC H&P      Scribed by Cecile Daniel) as dictated by Darío Velazquez MD    I, Dr. Darío Velazquez, confirm that all documentation is accurate.     Darío Velazquez M.D.   [facility]

## 2019-10-01 NOTE — PATIENT INSTRUCTIONS
Meniscus Tear: Care Instructions  Your Care Instructions    The meniscus is rubbery tissue in the knee that acts as a shock absorber between the upper and lower leg bones. The meniscus also keeps your knee stable by spreading weight across it. Each knee has two menisci (plural of meniscus). You can tear a meniscus if you plant your foot and twist, or pivot. The meniscus also can wear down as you age, and it can tear from squatting or kneeling. Small tears may heal on their own with rest and some physical therapy. But a more serious tear may need surgery to repair it or to remove part of the meniscus. Your doctor may want you to see a doctor who specializes in bones and sports injuries. Follow-up care is a key part of your treatment and safety. Be sure to make and go to all appointments, and call your doctor if you are having problems. It's also a good idea to know your test results and keep a list of the medicines you take. How can you care for yourself at home? · Rest your knee when possible. · Do not squat or kneel. · Take pain medicines exactly as directed. ? If the doctor gave you a prescription medicine for pain, take it as prescribed. ? If you are not taking a prescription pain medicine, ask your doctor if you can take an over-the-counter medicine. · Put ice or a cold pack on your knee for 10 to 20 minutes at a time. Try to do this every 1 to 2 hours for the next 3 days (when you are awake) or until the swelling goes down. Put a thin cloth between the ice and your skin. · Prop up the sore leg on a pillow when you ice your knee or any time you sit or lie down during the next 3 days. Try to keep your leg above the level of your heart. This will help reduce swelling. · Follow your doctor's directions for using crutches or a knee brace, if suggested. · Follow your doctor's directions for exercises to keep your knee mobile and your leg muscles strong.  Here are a few exercises you can try if your doctor says it is okay. ? Quad sets: Lie down on the floor or the bed with your injured leg straight. Fully extend your leg--there should be no or little bend in your knee. Tighten the thigh (quadriceps) of your injured leg for 6 seconds. Do not lift your heel up. Relax your quadriceps for 10 seconds. Repeat this exercise 8 to 12 times several times during the day. ? Straight-leg raises: Lie down on the floor or the bed with your injured leg flat and your uninjured leg bent so that the bottom of your foot is on the floor or bed. Tighten the quadriceps of your injured leg. Keeping your knee as straight as possible, lift your injured leg off the bed until it is about 18 inches above the bed or floor. Lower your leg back down and relax for 5 seconds. Do 3 sets of 20 repetitions, or if you tire quickly, 3 sets of 8 to 12 repetitions. ? Heel raises: Stand with your feet a few inches apart. Rest your hands lightly on a counter or chair in front of you. Slowly raise your heels off the floor while keeping your knees straight. Hold for 3 seconds, then slowly lower your heels to the floor. Do 3 sets of 8 to 12 repetitions. ? Heel slides: Lie down on the floor or the bed with your leg flat. Slowly begin to slide your heel toward your rear end (buttocks), keeping your heel on the floor. Your knee will begin to bend. Slide your heel and bend your knee until it becomes a little sore and you can feel a small amount of pressure inside your knee. Hold this position for 10 seconds. Slide your heel back down until your leg is straight on the floor. Relax for 10 seconds. Repeat this exercise 20 times. When should you call for help? Watch closely for changes in your health, and be sure to contact your doctor if:    · You have increasing knee pain or swelling or both.     · Your knee is so sore or stiff that you cannot walk on it.     · You do not get better as expected. Where can you learn more?   Go to http://mae-rakan.info/. Enter P624 in the search box to learn more about \"Meniscus Tear: Care Instructions. \"  Current as of: June 26, 2019  Content Version: 12.2  © 9484-2149 [employer]. Care instructions adapted under license by [employer] (which disclaims liability or warranty for this information). If you have questions about a medical condition or this instruction, always ask your healthcare professional. [place] [address] any warranty or liability for your use of this information.

## 2019-10-14 ENCOUNTER — HOSPITAL ENCOUNTER (OUTPATIENT)
Dept: PREADMISSION TESTING | Age: 54
Discharge: HOME | End: 2019-10-14
Payer: MEDICAID

## 2019-10-14 DIAGNOSIS — Z01.818 PREOP EXAMINATION: ICD-10-CM

## 2019-10-14 DIAGNOSIS — Z01.818 PREOP EXAMINATION: Primary | ICD-10-CM

## 2019-10-14 LAB
ANION GAP SERPL CALC-SCNC: 3 MMOL/L (ref 3–18)
ATRIAL RATE: 94 BPM
BASOPHILS # BLD: 0 K/UL (ref 0–0.1)
BASOPHILS NFR BLD: 0 % (ref 0–2)
BUN SERPL-MCNC: 25 MG/DL (ref 7–18)
BUN/CREAT SERPL: 20 (ref 12–20)
CALCIUM SERPL-MCNC: 9.1 MG/DL (ref 8.5–10.1)
CALCULATED P AXIS, ECG09: 53 DEGREES
CALCULATED R AXIS, ECG10: 15 DEGREES
CALCULATED T AXIS, ECG11: 1 DEGREES
CHLORIDE SERPL-SCNC: 105 MMOL/L (ref 100–111)
CO2 SERPL-SCNC: 30 MMOL/L (ref 21–32)
CREAT SERPL-MCNC: 1.24 MG/DL (ref 0.6–1.3)
DIAGNOSIS, 93000: NORMAL
DIFFERENTIAL METHOD BLD: ABNORMAL
EOSINOPHIL # BLD: 0.5 K/UL (ref 0–0.4)
EOSINOPHIL NFR BLD: 6 % (ref 0–5)
ERYTHROCYTE [DISTWIDTH] IN BLOOD BY AUTOMATED COUNT: 14.4 % (ref 11.6–14.5)
GLUCOSE SERPL-MCNC: 98 MG/DL (ref 74–99)
HCT VFR BLD AUTO: 43.9 % (ref 36–48)
HGB BLD-MCNC: 15.5 G/DL (ref 13–16)
LYMPHOCYTES # BLD: 3 K/UL (ref 0.9–3.6)
LYMPHOCYTES NFR BLD: 39 % (ref 21–52)
MCH RBC QN AUTO: 31.9 PG (ref 24–34)
MCHC RBC AUTO-ENTMCNC: 35.3 G/DL (ref 31–37)
MCV RBC AUTO: 90.3 FL (ref 74–97)
MONOCYTES # BLD: 0.8 K/UL (ref 0.05–1.2)
MONOCYTES NFR BLD: 10 % (ref 3–10)
NEUTS SEG # BLD: 3.3 K/UL (ref 1.8–8)
NEUTS SEG NFR BLD: 45 % (ref 40–73)
P-R INTERVAL, ECG05: 146 MS
PLATELET # BLD AUTO: 276 K/UL (ref 135–420)
PMV BLD AUTO: 10.6 FL (ref 9.2–11.8)
POTASSIUM SERPL-SCNC: 4.5 MMOL/L (ref 3.5–5.5)
Q-T INTERVAL, ECG07: 352 MS
QRS DURATION, ECG06: 74 MS
QTC CALCULATION (BEZET), ECG08: 440 MS
RBC # BLD AUTO: 4.86 M/UL (ref 4.7–5.5)
SODIUM SERPL-SCNC: 138 MMOL/L (ref 136–145)
VENTRICULAR RATE, ECG03: 94 BPM
WBC # BLD AUTO: 7.5 K/UL (ref 4.6–13.2)

## 2019-10-14 PROCEDURE — 93005 ELECTROCARDIOGRAM TRACING: CPT

## 2019-10-14 PROCEDURE — 36415 COLL VENOUS BLD VENIPUNCTURE: CPT

## 2019-10-14 PROCEDURE — 85025 COMPLETE CBC W/AUTO DIFF WBC: CPT

## 2019-10-14 PROCEDURE — 80048 BASIC METABOLIC PNL TOTAL CA: CPT

## 2019-10-22 ENCOUNTER — OFFICE VISIT (OUTPATIENT)
Dept: ORTHOPEDIC SURGERY | Age: 54
End: 2019-10-22

## 2019-10-22 VITALS
HEART RATE: 98 BPM | DIASTOLIC BLOOD PRESSURE: 89 MMHG | SYSTOLIC BLOOD PRESSURE: 160 MMHG | HEIGHT: 70 IN | RESPIRATION RATE: 18 BRPM | TEMPERATURE: 96.6 F | BODY MASS INDEX: 34.84 KG/M2 | WEIGHT: 243.4 LBS | OXYGEN SATURATION: 99 %

## 2019-10-22 DIAGNOSIS — S83.241D TEAR OF MEDIAL MENISCUS OF RIGHT KNEE, CURRENT, UNSPECIFIED TEAR TYPE, SUBSEQUENT ENCOUNTER: Primary | ICD-10-CM

## 2019-10-22 DIAGNOSIS — M17.11 PRIMARY OSTEOARTHRITIS OF RIGHT KNEE: ICD-10-CM

## 2019-10-22 RX ORDER — HYDROCODONE BITARTRATE AND ACETAMINOPHEN 10; 325 MG/1; MG/1
1 TABLET ORAL
Qty: 40 TAB | Refills: 0 | Status: SHIPPED | OUTPATIENT
Start: 2019-10-22 | End: 2019-10-29

## 2019-10-22 NOTE — PROGRESS NOTES
1. Have you been to the ER, urgent care clinic since your last visit? Hospitalized since your last visit? No    2. Have you seen or consulted any other health care providers outside of the [address] [place] [address] since your last visit? Include any pap smears or colon screening.  No

## 2019-10-22 NOTE — PATIENT INSTRUCTIONS
Dr. Rhonda Mckeon Knee Arthroscopy Surgery    What is the surgery? - This is an outpatient procedure at either [place] [address] or [address] [place], [place] [address] will be completely asleep for procedure. Dr. Rhonda Mckeon will make 2 small incisions in your knee. He will take a tour of your knee with the camera and then address the meniscal tear(s). We will be able to evaluate if any arthritis in your knee but this surgery is not to treat your arthritis. - Total surgery takes about 25-30 mins     What can you expect after surgery? - You will have a bulky dressing on your knee that you can remove 2 days after surgery. You will be able to shower 2 days after surgery but no soaking in a bath, hot tub, ocean or pool x 2 weeks to allow for full wound healing. No special brace is needed. - You will be on crutches or a walker when you leave the hospital. You can place weight on your leg as tolerated starting immediately. You are usually on crutches or your walker for about 4-5 days.   - Even though you can place weight on your leg, we recommend no walking or standing longer than 10mins for the first week. We will gradually increase your activities after that point.    - Dr. Rhonda Mckeon will start physical therapy for you when you return for your 1 week post op apt  - It will take your 6-8 weeks to fully recover from your surgery. When can I return to work? - Most patients return to desk work only after 1-2 weeks. We recommend no prolonged walking or standing, climbing, kneeling, or crawling x 6-8 weeks. Not all knee arthroscopies are the same. The specifics of your individual case will be discussed at length with you by Dr. Rhonda Mckeon and his Physician Assistant. Aleja Arnett  Surgical Coordinator  [facility] [address]. Celio.  [address], [address] [email or web address]  P: [phone]  F: [phone]
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
## 2019-11-04 ENCOUNTER — OFFICE VISIT (OUTPATIENT)
Dept: ORTHOPEDIC SURGERY | Age: 54
End: 2019-11-04

## 2019-11-04 VITALS
OXYGEN SATURATION: 99 % | TEMPERATURE: 96.5 F | SYSTOLIC BLOOD PRESSURE: 137 MMHG | DIASTOLIC BLOOD PRESSURE: 96 MMHG | RESPIRATION RATE: 18 BRPM | HEART RATE: 96 BPM | BODY MASS INDEX: 34.92 KG/M2 | HEIGHT: 70 IN

## 2019-11-04 DIAGNOSIS — S83.241D TEAR OF MEDIAL MENISCUS OF RIGHT KNEE, CURRENT, UNSPECIFIED TEAR TYPE, SUBSEQUENT ENCOUNTER: Primary | ICD-10-CM

## 2019-11-04 DIAGNOSIS — M17.11 PRIMARY OSTEOARTHRITIS OF RIGHT KNEE: ICD-10-CM

## 2019-11-04 RX ORDER — HYDROCODONE BITARTRATE AND ACETAMINOPHEN 5; 325 MG/1; MG/1
1 TABLET ORAL
Qty: 28 TAB | Refills: 0 | Status: SHIPPED | OUTPATIENT
Start: 2019-11-04 | End: 2019-11-11

## 2019-11-04 NOTE — PROGRESS NOTES
Patient: Susan Roe  YOB: 1965       HISTORY:  The patient presents for reevaluation of his right knee status post arthroscopic partial medial menisectomy on 10/28/19. Patient is improved, states pain is a 6 out of 10.  he has not gone to physical therapy. Patient denies any fever, chills, chest pain, shortness of breath or calf pain. The remainder of the review of systems is negative. There are no new illness or injuries to report since last seen in the office. No changes in medications, allergies, social or family history. PHYSICAL EXAMINATION:    Visit Vitals  BP (!) 137/96   Pulse 96   Temp 96.5 °F (35.8 °C) (Oral)   Resp 18   Ht 5' 10\" (1.778 m)   SpO2 99%   BMI 34.92 kg/m²     The patient is a well-developed, well-nourished male in no acute distress. The patient is alert and oriented times three. The patient appears to be well groomed. Mood and affect are normal.   ORTHOPEDIC EXAM of Right knee: Inspection: Effusion present,  incisions clean, dry intact, sutures in place  TTP: medial joint line  Range of motion: 0-100 flexion  Stability: Stable  Strength: 5/5  2+ distal pulses    IMPRESSION:  Status post Right knee arthroscopic partial medial menisectomy with grade IV chondromalacia / degen arthritis with joint space narrowing. PLAN: Incisions cleaned. Surgery was discussed at length today. Stressed to patient that nothing causes an increase in pain or swelling. Patient is weight bearing as tolerated. OK to d/c use of crutches/walker. Will set up with physical therapy. Patient request refill of pain medication. Patient given pain medication for short term acute pain relief. Goal is to treat patient according to above plan and to ultimately have patient off all pain medications once appropriate. If chronic pain management is required beyond what is expected for current orthopedic problem, will refer patient to pain management.   was reviewed and will be reviewed with every medication refill request.    Patient will follow up in 3 weeks.     Patient seen and evaluated by Dr. Jose Gonzalez today who agrees with treatment plan       LINO [facility]

## 2019-11-04 NOTE — PROGRESS NOTES
1. Have you been to the ER, urgent care clinic since your last visit? Hospitalized since your last visit? No    2. Have you seen or consulted any other health care providers outside of the [address] [place] [address] since your last visit? Include any pap smears or colon screening.  No

## 2019-11-13 ENCOUNTER — HOSPITAL ENCOUNTER (OUTPATIENT)
Dept: PHYSICAL THERAPY | Age: 54
Discharge: HOME | End: 2019-11-13
Payer: MEDICAID

## 2019-11-13 PROCEDURE — 97110 THERAPEUTIC EXERCISES: CPT

## 2019-11-13 PROCEDURE — 97162 PT EVAL MOD COMPLEX 30 MIN: CPT
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
## 2019-11-13 NOTE — PROGRESS NOTES
PT DAILY TREATMENT NOTE 10-18    Patient Name: Shira Kennedy  Date:2019  : 1965  [x]  Patient  Verified  Payor: BLUE CROSS MEDICAID / Plan: [employer] / Product Type: Managed Care Medicaid /    In time:805  Out time:900  Total Treatment Time (min): 55  Visit #: 1 of 8    Medicare/BCBS Only   Total Timed Codes (min):  15 1:1 Treatment Time:  54       Treatment Area: Pain in right knee [M25.561]    SUBJECTIVE  Pain Level (0-10 scale): 4  Any medication changes, allergies to medications, adverse drug reactions, diagnosis change, or new procedure performed?: [x] No    [] Yes (see summary sheet for update)  Subjective functional status/changes:   [] No changes reported  Pt initial eval today, see note for details    OBJECTIVE    Modality rationale: decrease pain to improve the patients ability to perform functional ADLs   Min Type Additional Details    [] Estim:  []Unatt       []IFC  []Premod                        []Other:  []w/ice   []w/heat  Position:  Location:    [] Estim: []Att    []TENS instruct  []NMES                    []Other:  []w/US   []w/ice   []w/heat  Position:  Location:    []  Traction: [] Cervical       []Lumbar                       [] Prone          []Supine                       []Intermittent   []Continuous Lbs:  [] before manual  [] after manual    []  Ultrasound: []Continuous   [] Pulsed                           []1MHz   []3MHz W/cm2:  Location:    []  Iontophoresis with dexamethasone         Location: [] Take home patch   [] In clinic   5 [x]  Ice     []  heat  []  Ice massage  []  Laser   []  Anodyne Position:supine  Location:R knee    []  Laser with stim  []  Other:  Position:  Location:    []  Vasopneumatic Device Pressure:       [] lo [] med [] hi   Temperature: [] lo [] med [] hi   [] Skin assessment post-treatment:  []intact []redness- no adverse reaction    []redness - adverse reaction:     35 min [x]Eval                  []Re-Eval       15 min Therapeutic Exercise:  [] See flow sheet :   Rationale: increase ROM and increase strength to improve the patients ability to walk and squat for job duties            With   [] TE   [] TA   [] neuro   [] other: Patient Education: [x] Review HEP    [] Progressed/Changed HEP based on:   [] positioning   [] body mechanics   [] transfers   [] heat/ice application    [] other:      Other Objective/Functional Measures: Justification for Eval Code Complexity:  Patient History : see POC   Examination see exam   Clinical Presentation: evolving  Clinical Decision Making : FOTO : 68/100       Pain Level (0-10 scale) post treatment: 4    ASSESSMENT/Changes in Function: Pt was instructed in initial HEP required demo, vc, and tc for all exercises to perform correctly. Pt was given hand out detailing exercises and instructed in modification of exercises to tolerance, and in performing exercises safely. Pt verbalized understanding. Patient will continue to benefit from skilled PT services to modify and progress therapeutic interventions, address functional mobility deficits, address ROM deficits, address strength deficits, analyze and address soft tissue restrictions, analyze and cue movement patterns, analyze and modify body mechanics/ergonomics, assess and modify postural abnormalities, address imbalance/dizziness and instruct in home and community integration to attain remaining goals.      [x]  See Plan of Care  []  See progress note/recertification  []  See Discharge Summary         Progress towards goals / Updated goals:  No progress as goals were set today    PLAN  [x]  Upgrade activities as tolerated     [x]  Continue plan of care  []  Update interventions per flow sheet       []  Discharge due to:_  []  Other:_      Luis Piedra 11/13/2019  8:03 AM    Future Appointments   Date Time Provider Estuardo Miranda   11/25/2019  2:10 PM Amira Reyna MD [facility] [address]

## 2019-11-15 ENCOUNTER — HOSPITAL ENCOUNTER (OUTPATIENT)
Dept: PHYSICAL THERAPY | Age: 54
Discharge: HOME | End: 2019-11-15
Payer: MEDICAID

## 2019-11-15 PROCEDURE — 97110 THERAPEUTIC EXERCISES: CPT

## 2019-11-15 NOTE — PROGRESS NOTES
PT DAILY TREATMENT NOTE 10-18    Patient Name: Herbie Oliveros  Date:11/15/2019  : 1965  [x]  Patient  Verified  Payor: BLUE CROSS MEDICAID / Plan: [facility] [employer] PLUS / Product Type: Managed Care Medicaid /    In time:11:30  Out time:11:58  Total Treatment Time (min): 28  Visit #: 2 of 8    Medicare/BCBS Only   Total Timed Codes (min):  28 1:1 Treatment Time:  28       Treatment Area: Pain in right knee [M25.561]    SUBJECTIVE  Pain Level (0-10 scale): 4  Any medication changes, allergies to medications, adverse drug reactions, diagnosis change, or new procedure performed?: [x] No    [] Yes (see summary sheet for update)  Subjective functional status/changes:   [] No changes reported  Pt reports feeling fine    OBJECTIVE    28 min Therapeutic Exercise:  [x] See flow sheet :   Rationale: increase ROM and increase strength to improve the patients ability to perform ADLs            With   [] TE   [] TA   [] neuro   [] other: Patient Education: [x] Review HEP    [] Progressed/Changed HEP based on:   [] positioning   [] body mechanics   [] transfers   [] heat/ice application    [] other:      Other Objective/Functional Measures:   First f/u after Eval     Pain Level (0-10 scale) post treatment: 4    ASSESSMENT/Changes in Function: Initiated treatment program per flow sheet. Reviewed HEP for understanding and compliance. Pt performed all prescribed therex without difficulty. Notes \"tightness\" with knee flex stretches. No change in pain level following treatment today, cont's to amb with min antalgic gait. Patient will continue to benefit from skilled PT services to modify and progress therapeutic interventions, address functional mobility deficits, address ROM deficits, address strength deficits, analyze and address soft tissue restrictions, analyze and cue movement patterns, analyze and modify body mechanics/ergonomics and assess and modify postural abnormalities to attain remaining goals. []  See Plan of Care  []  See progress note/recertification  []  See Discharge Summary         Progress towards goals / Updated goals:  1) Goal: Patient will be independent and compliant with HEP in order to progress toward long term goals. Status at last note/certification: issued and reviewed  Met- pt reports compliance 11/15/19  Long Term Goals: To be accomplished in 8 treatments:  1) Goal: Patient will improve FOTO assessment score to 70 pts in order to indicate improved functional abilities. Status at last note/certification: 68 pts  2) Goal: Patient will improve R knee flexion to 135 deg for normal walking and squatting. Status at last note/certification: 90 deg  3) Goal: Patient will report worst neck pain as 2/10 or less in order to progress toward personal goals. Status at last note/certification: 6/52  4) Goal: Patient will report overall at least 75% improvement in function in order to progress toward premorbid status.   Status at last note/certification: n/a  5) Goal: Patient will report ability to walk on uneven surfaces without restriction for job duties  Status at last note/certification: unable    PLAN  []  Upgrade activities as tolerated     [x]  Continue plan of care  []  Update interventions per flow sheet       []  Discharge due to:_  []  Other:_      Ciara Navas PTA 11/15/2019  11:32 AM    Future Appointments   Date Time Provider Estuardo Miranda   11/18/2019 10:00 AM [facility]   11/22/2019 10:00 AM [facility]   11/25/2019 10:00 AM [facility] Kirk [facility]   11/25/2019  2:10 PM Epifanio Montanez MD [facility]

## 2019-11-18 ENCOUNTER — APPOINTMENT (OUTPATIENT)
Dept: PHYSICAL THERAPY | Age: 54
End: 2019-11-18
Payer: MEDICAID

## 2019-11-19 ENCOUNTER — APPOINTMENT (OUTPATIENT)
Dept: PHYSICAL THERAPY | Age: 54
End: 2019-11-19
Payer: MEDICAID

## 2019-11-22 ENCOUNTER — APPOINTMENT (OUTPATIENT)
Dept: PHYSICAL THERAPY | Age: 54
End: 2019-11-22
Payer: MEDICAID

## 2019-12-03 ENCOUNTER — OFFICE VISIT (OUTPATIENT)
Dept: ORTHOPEDIC SURGERY | Age: 54
End: 2019-12-03

## 2019-12-03 VITALS
TEMPERATURE: 97.2 F | RESPIRATION RATE: 16 BRPM | HEART RATE: 93 BPM | SYSTOLIC BLOOD PRESSURE: 141 MMHG | OXYGEN SATURATION: 96 % | BODY MASS INDEX: 34.65 KG/M2 | WEIGHT: 242 LBS | HEIGHT: 70 IN | DIASTOLIC BLOOD PRESSURE: 102 MMHG

## 2019-12-03 DIAGNOSIS — M17.11 PRIMARY OSTEOARTHRITIS OF RIGHT KNEE: Primary | ICD-10-CM

## 2019-12-03 DIAGNOSIS — M16.12 ARTHRITIS OF LEFT HIP: ICD-10-CM

## 2019-12-03 DIAGNOSIS — S83.241D TEAR OF MEDIAL MENISCUS OF RIGHT KNEE, CURRENT, UNSPECIFIED TEAR TYPE, SUBSEQUENT ENCOUNTER: ICD-10-CM

## 2019-12-03 NOTE — PROGRESS NOTES
Patient: Ruth Colin  YOB: 1965       HISTORY:  The patient presents for reevaluation of his right knee status post arthroscopic partial medial menisectomy on 10/28/19. Patient states pain is a 8 out of 10.  he has not gone to physical therapy. Pt has been icing his knee but is still in a lot of pain today. 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 Pt received an injection in the left hip on 8/19/19 which provided relief up until 2 weeks ago. Patient denies any fever, chills, chest pain, shortness of breath or calf pain. The remainder of the review of systems is negative. There are no new illness or injuries to report since last seen in the office. No changes in medications, allergies, social or family history. Pain Assessment  12/3/2019   Location of Pain Knee   Pain Location Comment -   Location Modifiers Right   Severity of Pain 8   Quality of Pain Throbbing; Sharp;Dull;Aching;Burning;Cracking; Popping   Quality of Pain Comment -   Duration of Pain Persistent   Duration of Pain Comment -   Frequency of Pain Constant   Aggravating Factors Standing;Walking;Stairs; Bending;Stretching;Straightening   Aggravating Factors Comment -   Limiting Behavior -   Relieving Factors Nothing   Relieving Factors Comment -   Result of Injury No   Work-Related Injury -   How long out of work? -   Type of Injury -   Type of Injury Comment -     PHYSICAL EXAMINATION:    Visit Vitals  BP (!) 141/102 (BP 1 Location: Left arm, BP Patient Position: Sitting)   Pulse 93   Temp 97.2 °F (36.2 °C) (Oral)   Resp 16   Ht 5' 10\" (1.778 m)   Wt 242 lb (109.8 kg)   SpO2 96%   BMI 34.72 kg/m²     The patient is a well-developed, well-nourished male in no acute distress. The patient is alert and oriented times three. The patient appears to be well groomed. Mood and affect are normal.   ORTHOPEDIC EXAM of Right knee:   Inspection: Effusion present,  incisions clean, dry intact, sutures in place  TTP: medial joint line  Range of motion: 0-100 flexion  Stability: Stable  Strength: 5/5  2+ distal pulses    IMPRESSION:  Status post Right knee arthroscopic partial medial menisectomy with grade IV chondromalacia / degen arthritis with joint space narrowing. PLAN: Pt presents today Status post Right knee arthroscopic partial medial menisectomy with grade IV chondromalacia / degen arthritis with joint space narrowing. He is still in a significant amount of pain and we will proceed with Euflexxa authorization for the right knee and we will order another left hip injection under fluoro. RTC 2-3 weeks      Scribed by Polina Aragon) as dictated by Simon Blanco MD    I, Dr. Simon Blanco, confirm that all documentation is accurate.     Simon Blanco M.D.   [facility]

## 2019-12-03 NOTE — PROGRESS NOTES
1. Have you been to the ER, urgent care clinic since your last visit? Hospitalized since your last visit? No    2. Have you seen or consulted any other health care providers outside of the [address] [place] [address] since your last visit? Include any pap smears or colon screening.  No

## 2019-12-05 NOTE — PROGRESS NOTES
[facility] - [place] [address]  [address], [address] [phone] fax    Discharge Summary  Patient name: Sobeida Garcia Start of Care: 2019   Referral source: Ty Weems,* : 1965                Medical Diagnosis: Pain in right knee [M25.561]  Payor: BLUE CROSS MEDICAID / Plan: [address] [place] [address] / Product Type: Managed Care Medicaid /  Onset Date:10/28/19   Treatment Diagnosis: Pain in right knee [M25.561]   Prior Hospitalization: see medical history Provider#: [id number]   Medications: Verified on Patient summary List    Comorbidities: arthritis, HTN, tobacco use   Prior Level of Function: WNL, independent and active      Visits from Start of Care: 2    Missed Visits: 3    Reporting Period : 19 to 11/15/19    1) Goal: Patient will be independent and compliant with HEP in order to progress toward long term goals. Status at last note/certification: issued and reviewed  Met- pt reports compliance 11/15/19    Long Term Goals: To be accomplished in 8 treatments:  1) Goal: Patient will improve FOTO assessment score to 70 pts in order to indicate improved functional abilities. Status at last note/certification: 84 NFZ  Unable to reassess - NOT MET  2) Goal: Patient will improve R knee flexion to 135 deg for normal walking and squatting. Status at last note/certification: 90 deg  Unable to reassess - NOT MET  3) Goal: Patient will report worst neck pain as 2/10 or less in order to progress toward personal goals. Status at last note/certification: 8/10  Unable to reassess - NOT MET  4) Goal: Patient will report overall at least 75% improvement in function in order to progress toward premorbid status.   Status at last note/certification: n/a  Unable to reassess - NOT MET  5) Goal: Patient will report ability to walk on uneven surfaces without restriction for job duties  Status at last note/certification: unable  Unable to reassess - NOT MET    Assessment/Summary of care: The patient was scheduled for 2 visits. They attended 2 of them and was last seen on 11/15/19 with 3 missed visits. Due to the inability to further their care from non-attendance, we are discharging the patient from physical therapy at this time. We appreciate the kind referral and would willingly work with this patient again, should they be able to arrange regular attendance. Your patient's health is our primary concern. RECOMMENDATIONS:  [x]Discontinue therapy: []Patient has reached or is progressing toward set goals      [x]Patient is non-compliant or has abdicated      []Due to lack of appreciable progress towards set [address], PT 12/5/2019 1:29 PM    NOTE TO PHYSICIAN:  Please complete the following and fax to: [facility] at [place] [address] at [phone]  . Retain this original for your records. If you are unable to process this request in   24 hours, please contact our office.      [] I have read the above report and request that my patient continue therapy with the following changes/special instructions:  [] I have read the above report and request that my patient be discharged from therapy    Physician's Signature:____________Date:_________TIME:________    ** Signature, Date and Time must be completed for valid certification **

## 2019-12-09 ENCOUNTER — TELEPHONE (OUTPATIENT)
Dept: ORTHOPEDIC SURGERY | Age: 54
End: 2019-12-09

## 2019-12-09 NOTE — TELEPHONE ENCOUNTER
Patient called regarding his injection for his hip. He was wondering if it has been approved or not as well as scheduled.  Please advise patient at [phone]

## 2019-12-09 NOTE — TELEPHONE ENCOUNTER
Orders signed, called patient and told him authorization still pending, gave number for coordination of care if he does not here from them this week, hes aware once gel injections are approved our office will call him to schedule.

## 2019-12-17 ENCOUNTER — OFFICE VISIT (OUTPATIENT)
Dept: ORTHOPEDIC SURGERY | Age: 54
End: 2019-12-17

## 2019-12-17 VITALS
HEIGHT: 70 IN | BODY MASS INDEX: 34.36 KG/M2 | WEIGHT: 240 LBS | TEMPERATURE: 97.2 F | OXYGEN SATURATION: 100 % | HEART RATE: 101 BPM | SYSTOLIC BLOOD PRESSURE: 140 MMHG | DIASTOLIC BLOOD PRESSURE: 91 MMHG | RESPIRATION RATE: 16 BRPM

## 2019-12-17 DIAGNOSIS — M17.11 PRIMARY OSTEOARTHRITIS OF RIGHT KNEE: Primary | ICD-10-CM

## 2019-12-17 DIAGNOSIS — S83.241D TEAR OF MEDIAL MENISCUS OF RIGHT KNEE, CURRENT, UNSPECIFIED TEAR TYPE, SUBSEQUENT ENCOUNTER: ICD-10-CM

## 2019-12-17 RX ORDER — HYALURONATE SODIUM 10 MG/ML
2 SYRINGE (ML) INTRAARTICULAR ONCE
Qty: 2 ML | Refills: 0
Start: 2019-12-17 | End: 2019-12-17

## 2019-12-17 NOTE — PROGRESS NOTES
Patient: Carmen Esteves                MRN: [id number]       SSN: [id number]  YOB: 1965        AGE: 62 y.o. SEX: male  Body mass index is 34.44 kg/m². PCP: Lena Freed MD  12/17/19    Chief Complaint   Patient presents with    Knee Pain     right knee f/u        HISTORY:  Carmen Esteves is a 62 y.o. male who is seen for reevaluation of Right knee and here for 1st injection of Euflexxa. PROCEDURE:  The patient's Right knee, after timeout under sterile conditions, was injected with 2 cc of Euflexxa. [facility]  OFFICE PROCEDURE PROGRESS NOTE        Chart reviewed for the following:   Imelda Rajput MD, have reviewed the History, Physical and updated the Allergic reactions for [facility] [address] performed immediately prior to start of procedure:   Imelda Rajput MD, have performed the following reviews on Carmen Esteves prior to the start of the procedure:            * Patient was identified by name and date of birth   * Agreement on procedure being performed was verified  * Risks and Benefits explained to the patient  * Procedure site verified and marked as necessary  * Patient was positioned for comfort  * Consent was signed and verified     Time: 4:41 PM       Date of procedure: 12/17/2019    Procedure performed by:  Berry Reyes MD    Provider assisted by: None     How tolerated by patient: tolerated the procedure well with no complications    Comments: none    IMPRESSION:     ICD-10-CM ICD-9-CM    1. Primary osteoarthritis of right knee M17.11 715.16 [place] DRAIN/INJECT LARGE JOINT/BURSA      EUFLEXXA INJECTION PER DOSE      sodium hyaluronate (SUPARTZ FX/HYALGAN/GENIVSC) 10 mg/mL syrg injection   2.  Tear of medial meniscus of right knee, current, unspecified tear type, subsequent encounter S83.241D V58.89      836.0         PLAN:  Mr. Pamella Luong will return in one week for his second Euflexxa injection. Scribed by Joslyn Patel [address]) as dictated by Amrit Arenas MD    I, Dr. Amrit Arenas, confirm that all documentation is accurate.     Amrit Arenas M.D.   [facility]

## 2019-12-18 ENCOUNTER — HOSPITAL ENCOUNTER (OUTPATIENT)
Dept: GENERAL RADIOLOGY | Age: 54
Discharge: HOME | End: 2019-12-18
Attending: ORTHOPAEDIC SURGERY
Payer: MEDICAID

## 2019-12-18 DIAGNOSIS — M16.12 ARTHRITIS OF LEFT HIP: ICD-10-CM

## 2019-12-18 DIAGNOSIS — M17.11 PRIMARY OSTEOARTHRITIS OF RIGHT KNEE: ICD-10-CM

## 2019-12-18 DIAGNOSIS — S83.241D TEAR OF MEDIAL MENISCUS OF RIGHT KNEE, CURRENT, UNSPECIFIED TEAR TYPE, SUBSEQUENT ENCOUNTER: Primary | ICD-10-CM

## 2019-12-18 PROCEDURE — 74011000250 HC RX REV CODE- 250: Performed by: ORTHOPAEDIC SURGERY

## 2019-12-18 PROCEDURE — 74011250636 HC RX REV CODE- 250/636: Performed by: ORTHOPAEDIC SURGERY

## 2019-12-18 PROCEDURE — 77002 NEEDLE LOCALIZATION BY XRAY: CPT

## 2019-12-18 PROCEDURE — 74011636320 HC RX REV CODE- 636/320: Performed by: ORTHOPAEDIC SURGERY

## 2019-12-18 RX ORDER — LIDOCAINE HYDROCHLORIDE 10 MG/ML
30 INJECTION, SOLUTION EPIDURAL; INFILTRATION; INTRACAUDAL; PERINEURAL
Status: COMPLETED | OUTPATIENT
Start: 2019-12-18 | End: 2019-12-18

## 2019-12-18 RX ORDER — HYDROCODONE BITARTRATE AND ACETAMINOPHEN 5; 325 MG/1; MG/1
1 TABLET ORAL
Qty: 21 TAB | Refills: 0 | Status: SHIPPED | OUTPATIENT
Start: 2019-12-18 | End: 2019-12-25

## 2019-12-18 RX ORDER — METHYLPREDNISOLONE ACETATE 40 MG/ML
40 INJECTION, SUSPENSION INTRA-ARTICULAR; INTRALESIONAL; INTRAMUSCULAR; SOFT TISSUE
Status: COMPLETED | OUTPATIENT
Start: 2019-12-18 | End: 2019-12-18

## 2019-12-18 RX ADMIN — IOPAMIDOL 2 ML: 408 INJECTION, SOLUTION INTRATHECAL at 09:54

## 2019-12-18 RX ADMIN — LIDOCAINE HYDROCHLORIDE 12 ML: 10 INJECTION, SOLUTION EPIDURAL; INFILTRATION; INTRACAUDAL; PERINEURAL at 09:54

## 2019-12-18 RX ADMIN — METHYLPREDNISOLONE ACETATE 40 MG: 40 INJECTION, SUSPENSION INTRA-ARTICULAR; INTRALESIONAL; INTRAMUSCULAR; SOFT TISSUE at 09:54

## 2019-12-18 NOTE — TELEPHONE ENCOUNTER
Left message to return call. If patient calls back please inform of PABLO Bello message. Please inform him that rx is ready to be picked up at the [facility] location.

## 2019-12-18 NOTE — TELEPHONE ENCOUNTER
Patient states he only has a couple pills left. Last Visit: 12/17/19 with MD Gulshan Espinal  Next Appointment: 12/24/19 with MD Gulshan Espinal  Previous Refill Encounter(s): 11/4/19 #28    Requested Prescriptions     Pending Prescriptions Disp Refills    HYDROcodone-acetaminophen (NORCO) 5-325 mg per tablet 28 Tab 0     Sig: Take 1 Tab by mouth every six (6) hours as needed for Pain for up to 7 days. Max Daily Amount: 4 Tabs.

## 2019-12-24 ENCOUNTER — OFFICE VISIT (OUTPATIENT)
Dept: ORTHOPEDIC SURGERY | Age: 54
End: 2019-12-24

## 2019-12-24 VITALS
DIASTOLIC BLOOD PRESSURE: 101 MMHG | RESPIRATION RATE: 16 BRPM | SYSTOLIC BLOOD PRESSURE: 146 MMHG | HEART RATE: 96 BPM | BODY MASS INDEX: 34.36 KG/M2 | WEIGHT: 240 LBS | HEIGHT: 70 IN | OXYGEN SATURATION: 98 % | TEMPERATURE: 96.6 F

## 2019-12-24 DIAGNOSIS — M17.11 PRIMARY OSTEOARTHRITIS OF RIGHT KNEE: Primary | ICD-10-CM

## 2019-12-24 RX ORDER — HYALURONATE SODIUM 10 MG/ML
2 SYRINGE (ML) INTRAARTICULAR ONCE
Qty: 2 ML | Refills: 0
Start: 2019-12-24 | End: 2019-12-24

## 2019-12-24 NOTE — PROGRESS NOTES
Patient: Herbie Oliveros                MRN: [id number]       SSN: [id number]  YOB: 1965        AGE: 62 y.o. SEX: male  Body mass index is 34.44 kg/m². PCP: Pelon Spear MD  12/24/19    Chief Complaint   Patient presents with    Knee Pain     Right knee pain       HISTORY:  Herbie Oliveros is a 62 y.o. male who is seen for reevaluation of Right knee here for 2nd injection of euflexxa. PROCEDURE:  Patient's Right knee, after timeout under sterile conditions, was injected with 2 cc of Euflexxa. [facility]  OFFICE PROCEDURE PROGRESS NOTE        Chart reviewed for the following:   Cristobal CASTELLON PA-C, have reviewed the History, Physical and updated the Allergic reactions for [address] performed immediately prior to start of procedure:   Cristobal CASTELLON PA-C, have performed the following reviews on Herbie Oliveros prior to the start of the procedure:            * Patient was identified by name and date of birth   * Agreement on procedure being performed was verified  * Risks and Benefits explained to the patient  * Procedure site verified and marked as necessary  * Patient was positioned for comfort  * Consent was signed and verified     Time: 11:37 AM    Date of procedure: 12/24/2019    Procedure performed by:  PABLO Foreman    Provider assisted by: None     How tolerated by patient: tolerated the procedure well with no complications    Comments: none    IMPRESSION:     ICD-10-CM ICD-9-CM    1. Primary osteoarthritis of right knee M17.11 715.16 [place] DRAIN/INJECT LARGE JOINT/BURSA      EUFLEXXA INJECTION PER DOSE      sodium hyaluronate (SUPARTZ FX/HYALGAN/GENIVSC) 10 mg/mL syrg injection        PLAN:  Mr. Lorna Navarro will return in one week for his third Euflexxa injection.       Cristobal Yeh PA-C   [facility]

## 2019-12-24 NOTE — PROGRESS NOTES
1. Have you been to the ER, urgent care clinic since your last visit? Hospitalized since your last visit? No    2. Have you seen or consulted any other health care providers outside of the [address] [place] [address] since your last visit? Include any pap smears or colon screening.  No

## 2019-12-31 ENCOUNTER — OFFICE VISIT (OUTPATIENT)
Dept: ORTHOPEDIC SURGERY | Age: 54
End: 2019-12-31

## 2019-12-31 VITALS
TEMPERATURE: 95.9 F | HEIGHT: 70 IN | OXYGEN SATURATION: 99 % | HEART RATE: 90 BPM | WEIGHT: 242 LBS | RESPIRATION RATE: 16 BRPM | DIASTOLIC BLOOD PRESSURE: 91 MMHG | BODY MASS INDEX: 34.65 KG/M2 | SYSTOLIC BLOOD PRESSURE: 147 MMHG

## 2019-12-31 DIAGNOSIS — M17.11 PRIMARY OSTEOARTHRITIS OF RIGHT KNEE: Primary | ICD-10-CM

## 2019-12-31 RX ORDER — HYALURONATE SODIUM 10 MG/ML
2 SYRINGE (ML) INTRAARTICULAR ONCE
Qty: 2 ML | Refills: 0
Start: 2019-12-31 | End: 2019-12-31

## 2019-12-31 NOTE — PROGRESS NOTES
Patient: Tricia Jc                MRN: [id number]       SSN: [id number]  YOB: 1965        AGE: 62 y.o. SEX: male  Body mass index is 34.72 kg/m². PCP: Himanshu Quispe MD  12/31/19    Chief Complaint   Patient presents with    Knee Pain     Right knee pain       HISTORY:  Tricia Jc is a 62 y.o. male who is seen for reevaluation of Right knee here for 3rd and final injection of euflexxa. PROCEDURE:  Patient's Right knee, after timeout under sterile conditions, was injected with 2 cc of Euflexxa. [facility]  OFFICE PROCEDURE PROGRESS NOTE        Chart reviewed for the following:   Yan CASTELLON PA-C, have reviewed the History, Physical and updated the Allergic reactions for [address] performed immediately prior to start of procedure:   Yan CASTELLON PA-C, have performed the following reviews on Tricia Jc prior to the start of the procedure:            * Patient was identified by name and date of birth   * Agreement on procedure being performed was verified  * Risks and Benefits explained to the patient  * Procedure site verified and marked as necessary  * Patient was positioned for comfort  * Consent was signed and verified     Time: 10:37 AM       Date of procedure: 12/31/2019    Procedure performed by:  PABLO Steve    Provider assisted by: None     How tolerated by patient: tolerated the procedure well with no complications    Comments: none    IMPRESSION:     ICD-10-CM ICD-9-CM    1. Primary osteoarthritis of right knee M17.11 715.16         PLAN: Mr. Rob Allen has completed his Euflexxa injection series. he will return as needed.       Yan Bond PA-C   [facility]

## 2019-12-31 NOTE — PROGRESS NOTES
1. Have you been to the ER, urgent care clinic since your last visit? Hospitalized since your last visit? No    2. Have you seen or consulted any other health care providers outside of the [address] [place] [address] since your last visit? Include any pap smears or colon screening.  No

## 2020-03-02 ENCOUNTER — TELEPHONE (OUTPATIENT)
Dept: ORTHOPEDIC SURGERY | Age: 55
End: 2020-03-02

## 2020-03-02 DIAGNOSIS — M17.11 PRIMARY OSTEOARTHRITIS OF RIGHT KNEE: Primary | ICD-10-CM

## 2020-03-02 NOTE — TELEPHONE ENCOUNTER
If pain is worsening in his knee he should be seen.  We can also rx voltaren gel for him if he wants

## 2020-03-02 NOTE — TELEPHONE ENCOUNTER
Patient called requesting a return call. When asked what it was in reference to all I could get was, that it pertained to his knee and getting a refill for a pain medicine.       [phone] Patient  Sintia Davis

## 2020-03-02 NOTE — TELEPHONE ENCOUNTER
Spoke with patient and informed of PABLO Valdovinos message. Patient would like rx for voltaren gel.

## 2020-03-03 ENCOUNTER — TELEPHONE (OUTPATIENT)
Dept: ORTHOPEDIC SURGERY | Age: 55
End: 2020-03-03

## 2020-03-03 RX ORDER — DICLOFENAC SODIUM 10 MG/G
4 GEL TOPICAL 4 TIMES DAILY
Qty: 500 G | Refills: 1 | Status: SHIPPED | OUTPATIENT
Start: 2020-03-03 | End: 2020-04-15 | Stop reason: SDUPTHER

## 2020-03-03 NOTE — TELEPHONE ENCOUNTER
Prior auth submitted through covermymeds. Waiting for response from insurance. Key: [id number]       Please do not close message.

## 2020-03-03 NOTE — TELEPHONE ENCOUNTER
Patient called and stated that he was informed by his pharmacy that a Prior Auth is needed for the diclofenac (VOLTAREN) 1 % gel    .

## 2020-03-10 ENCOUNTER — OFFICE VISIT (OUTPATIENT)
Dept: ORTHOPEDIC SURGERY | Age: 55
End: 2020-03-10

## 2020-03-10 VITALS
HEIGHT: 70 IN | DIASTOLIC BLOOD PRESSURE: 76 MMHG | RESPIRATION RATE: 18 BRPM | OXYGEN SATURATION: 96 % | BODY MASS INDEX: 34.9 KG/M2 | TEMPERATURE: 97.1 F | HEART RATE: 93 BPM | WEIGHT: 243.8 LBS | SYSTOLIC BLOOD PRESSURE: 122 MMHG

## 2020-03-10 DIAGNOSIS — S83.241D TEAR OF MEDIAL MENISCUS OF RIGHT KNEE, CURRENT, UNSPECIFIED TEAR TYPE, SUBSEQUENT ENCOUNTER: ICD-10-CM

## 2020-03-10 DIAGNOSIS — M17.11 PRIMARY OSTEOARTHRITIS OF RIGHT KNEE: Primary | ICD-10-CM

## 2020-03-10 DIAGNOSIS — M16.12 ARTHRITIS OF LEFT HIP: ICD-10-CM

## 2020-03-10 NOTE — PROGRESS NOTES
Fox Teresa  1965   No chief complaint on file. HISTORY OF PRESENT ILLNESS  Carrie Swanson is a 60 y.o. male who presents today for reevaluation of right knee pain. Patient rates pain as 9/10 today. Pt finished a series of Euflexxa injections on 12/31/19 but still complains of persistent pain. Pt is status post arthroscopic partial medial menisectomy on 10/28/19. Has had previous cortisone injections. Pain with steps, prolonged walking. He states he has to sleep with a pillow between his legs at night. He also complains of left hip pain today. He last received an intraarticular left hip injection under fluoro on 12/18/19 and the pain has returned. Patient denies any fever, chills, chest pain, shortness of breath or calf pain. The remainder of the review of systems is negative. There are no new illness or injuries to report since last seen in the office. There are no changes to medications, allergies, family or social history. Pain Assessment  3/10/2020   Location of Pain Knee   Pain Location Comment -   Location Modifiers Right   Severity of Pain 9   Quality of Pain Throbbing; Jackie Babinski; Aching   Quality of Pain Comment -   Duration of Pain Persistent   Duration of Pain Comment -   Frequency of Pain Constant   Aggravating Factors Walking;Standing;Bending   Aggravating Factors Comment -   Limiting Behavior -   Relieving Factors Nothing   Relieving Factors Comment -   Result of Injury No   Work-Related Injury -   How long out of work? -   Type of Injury -   Type of Injury Comment -       PHYSICAL EXAM:   Visit Vitals  /76 (BP 1 Location: Right arm, BP Patient Position: Sitting)   Pulse 93   Temp 97.1 °F (36.2 °C) (Oral)   Resp 18   Ht 5' 10\" (1.778 m)   Wt 243 lb 12.8 oz (110.6 kg)   SpO2 96%   BMI 34.98 kg/m²     The patient is a well-developed, well-nourished male   in no acute distress. The patient is alert and oriented times three. The patient is alert and oriented times three.  Mood and affect are normal.  LYMPHATIC: lymph nodes are not enlarged and are within normal limits  SKIN: normal in color and non tender to palpation. There are no bruises or abrasions noted. NEUROLOGICAL: Motor sensory exam is within normal limits. Reflexes are equal bilaterally. There is normal sensation to pinprick and light touch  MUSCULOSKELETAL:  Examination Right knee   Skin Intact   Range of motion 0-130   Effusion -   Medial joint line tenderness -   Lateral joint line tenderness -   Tenderness Pes Bursa -   Tenderness insertion MCL -   Tenderness insertion LCL -   Teres -   Patella crepitus -   Patella grind -   Lachman -   Pivot shift -   Anterior drawer -   Posterior drawer -   Varus stress -   Valgus stress -   Neurovascular Intact   Calf Swelling and Tenderness to Palpation -   Ying's Test -   Hamstring Cord Tightness -     Examination Left hip   Skin Intact   Flexion ROM 80   Extension ROM 0   External Rotation ROM 10   Internal Rotation ROM 10   Abduction ROM 20   Adduction ROM 20   FADDIR +   FRANCI +   Log roll test -   Trochanteric tenderness -   Fabricio test -   Neurovascular Intact        IMAGING: XR of right knee dated 7/30/19 was reviewed and read: Marked decreased joint space medially with degenerative changes in patellofemoral joint, moderate in severity     XR of left hip dated 7/30/19 was reviewed and read:   Subchondral cysts and changes in acetabular femoral head bilaterally, worse on left      IMPRESSION:      ICD-10-CM ICD-9-CM    1. Primary osteoarthritis of right knee M17.11 715.16    2. Tear of medial meniscus of right knee, current, unspecified tear type, subsequent encounter S83.241D V58.89      836.0    3. Arthritis of left hip M16.12 716.95 XR FLUORO GUIDE ASP/BX/INJ/LOC        PLAN:   1. Pt presents today with right knee pain that has persisted despite the Euflexxa injections and all other treatment and I am referring him to Dr. Raymundo Dorado to discuss surgical options.  He also complains of returning left hip pain today and I am ordering another intraarticular hip injection under fluoro. Risk factors include: htn, BMI>30  2. No ultrasound exam indicated today  3. Yes cortisone injection indicated today INTRAARTICULAR L HIP UNDER FLUORO  4. No Physical/Occupational Therapy indicated today  5. No diagnostic test indicated today:   6. No durable medical equipment indicated today  7. Yes referral indicated today DR GILMORE  8. No medications indicated today:   9. No Narcotic indicated today      RTC prn      Scribed by [address]) as dictated by Talia Ruiz MD    I, Dr. Talia Ruiz, confirm that all documentation is accurate.     Talia Ruiz M.D.   [facility]

## 2020-03-18 ENCOUNTER — HOSPITAL ENCOUNTER (OUTPATIENT)
Dept: LAB | Age: 55
Discharge: HOME | End: 2020-03-18

## 2020-03-18 ENCOUNTER — HOSPITAL ENCOUNTER (OUTPATIENT)
Dept: GENERAL RADIOLOGY | Age: 55
Discharge: HOME | End: 2020-03-18
Attending: ORTHOPAEDIC SURGERY
Payer: MEDICAID

## 2020-03-18 DIAGNOSIS — M16.12 ARTHRITIS OF LEFT HIP: ICD-10-CM

## 2020-03-18 LAB — XX-LABCORP SPECIMEN COL,LCBCF: NORMAL

## 2020-03-18 PROCEDURE — 99001 SPECIMEN HANDLING PT-LAB: CPT

## 2020-03-18 PROCEDURE — 77002 NEEDLE LOCALIZATION BY XRAY: CPT

## 2020-03-18 PROCEDURE — 74011636320 HC RX REV CODE- 636/320: Performed by: ORTHOPAEDIC SURGERY

## 2020-03-18 PROCEDURE — 74011250636 HC RX REV CODE- 250/636: Performed by: ORTHOPAEDIC SURGERY

## 2020-03-18 PROCEDURE — 74011000250 HC RX REV CODE- 250: Performed by: ORTHOPAEDIC SURGERY

## 2020-03-18 RX ORDER — LIDOCAINE HYDROCHLORIDE 10 MG/ML
30 INJECTION, SOLUTION EPIDURAL; INFILTRATION; INTRACAUDAL; PERINEURAL
Status: COMPLETED | OUTPATIENT
Start: 2020-03-18 | End: 2020-03-18

## 2020-03-18 RX ORDER — METHYLPREDNISOLONE ACETATE 40 MG/ML
40 INJECTION, SUSPENSION INTRA-ARTICULAR; INTRALESIONAL; INTRAMUSCULAR; SOFT TISSUE
Status: COMPLETED | OUTPATIENT
Start: 2020-03-18 | End: 2020-03-18

## 2020-03-18 RX ADMIN — METHYLPREDNISOLONE ACETATE 40 MG: 40 INJECTION, SUSPENSION INTRA-ARTICULAR; INTRALESIONAL; INTRAMUSCULAR; SOFT TISSUE at 09:23

## 2020-03-18 RX ADMIN — IOPAMIDOL 10 ML: 408 INJECTION, SOLUTION INTRATHECAL at 09:23

## 2020-03-18 RX ADMIN — LIDOCAINE HYDROCHLORIDE 30 ML: 10 INJECTION, SOLUTION EPIDURAL; INFILTRATION; INTRACAUDAL; PERINEURAL at 09:23

## 2020-04-15 ENCOUNTER — DOCUMENTATION ONLY (OUTPATIENT)
Dept: ORTHOPEDIC SURGERY | Age: 55
End: 2020-04-15

## 2020-04-15 ENCOUNTER — TELEPHONE (OUTPATIENT)
Dept: ORTHOPEDIC SURGERY | Age: 55
End: 2020-04-15

## 2020-04-15 DIAGNOSIS — M17.11 PRIMARY OSTEOARTHRITIS OF RIGHT KNEE: ICD-10-CM

## 2020-04-15 RX ORDER — DICLOFENAC SODIUM 10 MG/G
4 GEL TOPICAL 4 TIMES DAILY
Qty: 500 G | Refills: 1 | Status: SHIPPED | OUTPATIENT
Start: 2020-04-15

## 2020-04-15 NOTE — TELEPHONE ENCOUNTER
10/3/19 - Rt knee arthroscopic partial medical meniscectomy    Patient is requesting a new pain medication as Vicodin that was previously prescribed is not effective. He says we have referred him to another provider, however, they can't schedule him until June. If approved, please call in to [address] on file. Advise patient at [phone].

## 2020-04-15 NOTE — PROGRESS NOTES
Received message stating prior auth needed for voltaren gel. Prior auth submitted through covermymeds. Waiting for response from insurance. Key: [id number]      Prior Don Salas was approved.

## 2020-05-07 ENCOUNTER — TELEPHONE (OUTPATIENT)
Dept: ORTHOPEDIC SURGERY | Age: 55
End: 2020-05-07

## 2020-05-07 DIAGNOSIS — M16.12 PRIMARY OSTEOARTHRITIS OF LEFT HIP: Primary | ICD-10-CM

## 2020-05-07 NOTE — TELEPHONE ENCOUNTER
Patient is requesting a new order for left hip injection - he's aware it's too soon for injection, however, he'd like to get it ordered and scheduled as soon as possible to reserve a spot. Please contact patient for scheduling at [phone].

## 2020-05-07 NOTE — TELEPHONE ENCOUNTER
Left intraarticular hip injection order entered to be performed 6/18/20 or after.  will contact patient to schedule appointment.

## 2020-05-18 ENCOUNTER — HOSPITAL ENCOUNTER (OUTPATIENT)
Dept: GENERAL RADIOLOGY | Age: 55
Discharge: HOME | End: 2020-05-18
Attending: PHYSICIAN ASSISTANT
Payer: MEDICAID

## 2020-05-18 DIAGNOSIS — M16.12 PRIMARY OSTEOARTHRITIS OF LEFT HIP: ICD-10-CM

## 2020-05-18 PROCEDURE — 74011250636 HC RX REV CODE- 250/636: Performed by: PHYSICIAN ASSISTANT

## 2020-05-18 PROCEDURE — 74011636320 HC RX REV CODE- 636/320: Performed by: PHYSICIAN ASSISTANT

## 2020-05-18 PROCEDURE — 77002 NEEDLE LOCALIZATION BY XRAY: CPT

## 2020-05-18 PROCEDURE — 74011000250 HC RX REV CODE- 250: Performed by: PHYSICIAN ASSISTANT

## 2020-05-18 RX ORDER — METHYLPREDNISOLONE ACETATE 40 MG/ML
40 INJECTION, SUSPENSION INTRA-ARTICULAR; INTRALESIONAL; INTRAMUSCULAR; SOFT TISSUE
Status: COMPLETED | OUTPATIENT
Start: 2020-05-18 | End: 2020-05-18

## 2020-05-18 RX ORDER — LIDOCAINE HYDROCHLORIDE 10 MG/ML
30 INJECTION, SOLUTION EPIDURAL; INFILTRATION; INTRACAUDAL; PERINEURAL
Status: COMPLETED | OUTPATIENT
Start: 2020-05-18 | End: 2020-05-18

## 2020-05-18 RX ADMIN — METHYLPREDNISOLONE ACETATE 40 MG: 40 INJECTION, SUSPENSION INTRA-ARTICULAR; INTRALESIONAL; INTRAMUSCULAR; SOFT TISSUE at 10:33

## 2020-05-18 RX ADMIN — IOPAMIDOL 2 ML: 408 INJECTION, SOLUTION INTRATHECAL at 10:33

## 2020-05-18 RX ADMIN — LIDOCAINE HYDROCHLORIDE 12 ML: 10 INJECTION, SOLUTION EPIDURAL; INFILTRATION; INTRACAUDAL; PERINEURAL at 10:33

## 2020-06-03 ENCOUNTER — OFFICE VISIT (OUTPATIENT)
Dept: ORTHOPEDIC SURGERY | Age: 55
End: 2020-06-03

## 2020-06-03 VITALS — WEIGHT: 243 LBS | HEIGHT: 70 IN | TEMPERATURE: 95.3 F | BODY MASS INDEX: 34.79 KG/M2

## 2020-06-03 DIAGNOSIS — M16.12 PRIMARY OSTEOARTHRITIS OF LEFT HIP: Primary | ICD-10-CM

## 2020-06-03 NOTE — PROGRESS NOTES
Varun Lauren  1965   Chief Complaint   Patient presents with    Hip Pain     left hip pain        HISTORY OF PRESENT ILLNESS  Varun Lauren is a 60 y.o. male who presents today for reevaluation of left hip pain. Patient rates pain as 9/10 today. He last received an intraarticular left hip injection under fluoro on 5/18/2020 which provided minimal relief. Has had prior hip injections. Pain with bending, stairs, walking, standing, and squatting. Patient denies any fever, chills, chest pain, shortness of breath or calf pain. The remainder of the review of systems is negative. There are no new illness or injuries to report since last seen in the office. There are no changes to medications, allergies, family or social history. Pain Assessment  6/3/2020   Location of Pain Hip   Pain Location Comment -   Location Modifiers Left   Severity of Pain 9   Quality of Pain Sharp; Aching; Throbbing;Burning   Quality of Pain Comment stabbing, soreness   Duration of Pain Persistent   Duration of Pain Comment -   Frequency of Pain Constant   Aggravating Factors Bending;Stairs; Walking;Standing;Squatting   Aggravating Factors Comment -   Limiting Behavior -   Relieving Factors Nothing   Relieving Factors Comment -   Result of Injury No   Work-Related Injury -   How long out of work? -   Type of Injury -   Type of Injury Comment -       PHYSICAL EXAM:   Visit Vitals  Temp (!) 95.3 °F (35.2 °C) (Oral)   Ht 5' 10\" (1.778 m)   Wt 243 lb (110.2 kg)   BMI 34.87 kg/m²     The patient is a well-developed, well-nourished male   in no acute distress. The patient is alert and oriented times three. The patient is alert and oriented times three. Mood and affect are normal.  LYMPHATIC: lymph nodes are not enlarged and are within normal limits  SKIN: normal in color and non tender to palpation. There are no bruises or abrasions noted. NEUROLOGICAL: Motor sensory exam is within normal limits. Reflexes are equal bilaterally. There is normal sensation to pinprick and light touch  MUSCULOSKELETAL:   Examination Left hip   Skin Intact   Flexion ROM 80   Extension ROM 0   External Rotation ROM 10   Internal Rotation ROM 10   Abduction ROM 20   Adduction ROM 20   FADDIR +   FRANCI +   Log roll test -   Trochanteric tenderness -   Fabricio test -   Neurovascular Intact        IMAGING:  XR of left hip dated 7/30/19 was reviewed and read: Subchondral cysts and changes in acetabular femoral head bilaterally, worse on left      IMPRESSION:      ICD-10-CM ICD-9-CM    1. Primary osteoarthritis of left hip M16.12 715.15 MRI HIP LT WO CONT        PLAN:   1. Pt presents today with persistent left hip pain due to primary OA despite multiple intraarticular hip injections and I am ordering an MRI to assess osteoarthritis vs. PVNS. Will refer to Dr. Alicia Cain to discuss further treatment options. Risk factors include: htn, BMI>30  2. No ultrasound exam indicated today  3. No cortisone injection indicated today  4. No Physical/Occupational Therapy indicated today  5. Yes diagnostic test indicated today: MRI L HIP WO CONT  6. No durable medical equipment indicated today  7. Yes referral indicated today DR GILMORE  8. No medications indicated today:   9. No Narcotic indicated today      RTC prn      Scribed by [address]) as dictated by Esme Guillen MD    I, Dr. Esme Guillen, confirm that all documentation is accurate.     Esme Guillen M.D.   [facility]

## 2020-07-07 ENCOUNTER — HOSPITAL ENCOUNTER (OUTPATIENT)
Dept: MRI IMAGING | Age: 55
Discharge: HOME | End: 2020-07-07
Attending: ORTHOPAEDIC SURGERY
Payer: MEDICAID

## 2020-07-07 DIAGNOSIS — M16.12 PRIMARY OSTEOARTHRITIS OF LEFT HIP: ICD-10-CM

## 2020-07-07 PROCEDURE — 73721 MRI JNT OF LWR EXTRE W/O DYE: CPT

## 2020-07-09 ENCOUNTER — OFFICE VISIT (OUTPATIENT)
Dept: ORTHOPEDIC SURGERY | Facility: CLINIC | Age: 55
End: 2020-07-09

## 2020-07-09 VITALS
BODY MASS INDEX: 35.13 KG/M2 | WEIGHT: 245.4 LBS | SYSTOLIC BLOOD PRESSURE: 119 MMHG | HEART RATE: 97 BPM | TEMPERATURE: 97.5 F | DIASTOLIC BLOOD PRESSURE: 80 MMHG | HEIGHT: 70 IN

## 2020-07-09 DIAGNOSIS — M25.552 CHRONIC LEFT HIP PAIN: ICD-10-CM

## 2020-07-09 DIAGNOSIS — G89.29 CHRONIC LEFT HIP PAIN: ICD-10-CM

## 2020-07-09 DIAGNOSIS — M16.12 PRIMARY OSTEOARTHRITIS OF LEFT HIP: Primary | ICD-10-CM

## 2020-07-09 NOTE — PROGRESS NOTES
Patient: Tricia Jc                MRN: [id number]       SSN: [id number]  YOB: 1965        AGE: 60 y.o. SEX: male    PCP: Himanshu Quispe MD  07/09/20    CC: LEFT HIP PAIN    HISTORY:  Tricia Jc is a 60 y.o. male who is seen for left hip pain. He has been experiencing left hip pain for the past several years. He feels pain in his groin and lateral hip with standing and walking. He can't lay on his left side at night. His hip stiffens up after he sits for long periods of time. He states that his hip started hurting after Dr. Marli Orozco examined his left hip. He states that he walked out of Dr. Mojgan Jacob office with a limp. Previous cortisone injections did not help as much as he would have liked. He takes 800 mg of ibuprofen for pain. He is s/p right knee arthroscopy by Dr. Jose Gonzalez. Pain Assessment  7/9/2020   Location of Pain Knee;Hip   Pain Location Comment -   Location Modifiers Left;Right   Severity of Pain 10   Quality of Pain Sharp; Throbbing;Dull;Aching;Burning;Locking;Grinding; Popping;Cracking   Quality of Pain Comment -   Duration of Pain Persistent   Duration of Pain Comment -   Frequency of Pain Constant   Aggravating Factors Bending;Stretching;Straightening;Exercise;Kneeling;Squatting;Standing;Walking;Stairs   Aggravating Factors Comment -   Limiting Behavior Yes   Relieving Factors NSAID   Relieving Factors Comment -   Result of Injury -   Work-Related Injury -   How long out of work? 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 He lives in [place] with his wife. He has 5 adult daughters and 1 adult son. He is not diabetic. He is hypertensive. He gained weight recently. Mr. Rob Allen weighs 245 lbs and is 5'10\" tall.        No results found for: HBA1C, HGBE8, IRA0BAHP, RLL2CJCB, SUH5XMRY  Weight Metrics 7/9/2020 6/3/2020 3/10/2020 12/31/2019 12/24/2019 12/17/2019 12/3/2019   Weight 245 lb 6.4 oz 243 lb 243 lb 12.8 oz 242 lb 240 lb 240 lb 242 lb   BMI 35.21 kg/m2 34.87 kg/m2 34.98 kg/m2 34.72 kg/m2 34.44 kg/m2 34.44 kg/m2 34.72 kg/m2       Patient Active Problem List   Diagnosis Code    Severe obesity ([facility] [address] E66.01     REVIEW OF SYSTEMS:    Constitutional Symptoms: Negative   Eyes: Negative   Ears, Nose, Throat and Mouth: Negative   Cardiovascular: Negative   Respiratory: Negative   Genitourinary: Per HPI   Gastrointestinal: Per HPI   Integumentary (Skin and/or Breast): Negative   Musculoskeletal: Per HPI   Endocrine/Rheumatologic: Negative   Neurological: Per HPI   Hematology/Lymphatic: Negative    Allergic/Immunologic: Negative   Phychiatric: Negative    Social History     Socioeconomic History    Marital status: UNKNOWN     Spouse name: Not on file    Number of children: Not on file    Years of education: Not on file    Highest education level: Not on file   Occupational History    Not on file   Social Needs    Financial resource strain: Not on file    Food insecurity     Worry: Not on file     Inability: Not on file    Transportation needs     Medical: Not on file     Non-medical: Not on file   Tobacco Use    Smoking status: Current Every Day Smoker     Packs/day: 0.25     Years: 15.00     Pack years: 3.75    Smokeless tobacco: Never Used   Substance and Sexual Activity    Alcohol use: Yes     Comment: socially    Drug use: No    Sexual activity: Yes     Partners: Female   Lifestyle    Physical activity     Days per week: Not on file     Minutes per session: Not on file    Stress: Not on file   Relationships    Social connections     Talks on phone: Not on file     Gets together: Not on file     Attends Sikh service: Not on file     Active member of club or organization: Not on file     Attends meetings of clubs or organizations: Not on file     Relationship status: Not on file    Intimate partner violence     Fear of current or ex partner: Not on file     Emotionally abused: Not on file     Physically abused: Not on file     Forced sexual activity: Not on file   Other Topics Concern    Not on file   Social History Narrative    Not on file      Allergies   Allergen Reactions    Atlantic Juice Unknown (comments)      Current Outpatient Medications   Medication Sig    AMOXICILLIN PO Take  by mouth.  diclofenac (VOLTAREN) 1 % gel Apply 4 g to affected area four (4) times daily.  colchicine 0.6 mg tablet Take 0.6 mg by mouth two (2) times daily as needed (Gout pain).  lisinopril-hydroCHLOROthiazide (PRINZIDE, ZESTORETIC) 10-12.5 mg per tablet Take 1 Tab by mouth daily.  ibuprofen (MOTRIN) 800 mg tablet Take 800 mg by mouth every six (6) hours as needed for Pain. No current facility-administered medications for this visit. PHYSICAL EXAMINATION:  Visit Vitals  /80   Pulse 97   Temp 97.5 °F (36.4 °C)   Ht 5' 10\" (1.778 m)   Wt 245 lb 6.4 oz (111.3 kg)   BMI 35.21 kg/m²      ORTHO EXAMINATION:  Examination Right hip Left hip   Skin Intact Intact   External Rotation ROM 15 10   Internal Rotation ROM 10 0   Trochanteric tenderness - +   Hip flexion contracture - -   Antalgic gait + +   Trendelenberg sign - -   Lumbar tenderness - -   Straight leg raise - -   Calf tenderness - -   Neurovascular Intact Intact       MRI LEFT HIP 7/7/20  [facility]  IMPRESSION:   Severe osteoarthritis left hip.   Mild osteoporosis right hip.   Moderate fat-containing inguinal hernias bilaterally. RADIOGRAPHS:  XR LEFT HIP 7/30/19 [facility]  IMPRESSION:  AP pelvis and two views - No fractures, severe L, moderate R joint space narrowing, no osteophytes present. Tonnis grade 4, bone cyst on headball    IMPRESSION:      ICD-10-CM ICD-9-CM    1. Primary osteoarthritis of left hip  M16.12 715.15    2. Chronic left hip pain  M25.552 719.45     G89.29 338.29      PLAN: Dietary counseling provided today. Start weight loss with low carb diet and intermittent fasting.  He will be referred for bariatric surgery consultation. We discussed possible need for left hip arthroplasty at some time in the future if pain continues. He will follow up as needed.       Scribed by Law Bermudez ([address]) as dictated by Alyssia Damico MD

## 2020-07-10 ENCOUNTER — TELEPHONE (OUTPATIENT)
Dept: ORTHOPEDIC SURGERY | Age: 55
End: 2020-07-10

## 2020-07-10 DIAGNOSIS — M16.12 PRIMARY OSTEOARTHRITIS OF LEFT HIP: Primary | ICD-10-CM

## 2020-07-10 NOTE — TELEPHONE ENCOUNTER
Pt was seen yesterday for his left hip by dr Sherren Keeling. Pt wants to know if he can have a cortisone injection for the hip.     Please contact the patient at [phone]

## 2020-07-10 NOTE — TELEPHONE ENCOUNTER
Addressed with Dr. Yeimy Bishop and he said that the patient could follow up with the next available appointment and get an injection then. I tried to call the patient and received a notification that the mailbox was full. If the patient calls back we can tell him that we can schedule him an appointment to get an injection as his earliest convenience. Schedule accordingly.

## 2020-07-14 NOTE — TELEPHONE ENCOUNTER
Spoke with Dr. Kaylen Yost and he stated that we could place the order for a left hip fluoro guided injection to be done at the hospital. I placed the order and then called the patient and let him know. I told him someone would be calling him to set that up. He stated he understood.

## 2020-07-14 NOTE — TELEPHONE ENCOUNTER
Patient called again and is requesting to be set up to get an injection on the left hip at [facility]. Pt would like a call as soon as possible. Patient tel. [phone]. Note : patient was offered an appt to see Chester George today. Pt declined. Wants a steroid injection from [facility].

## 2020-07-24 ENCOUNTER — HOSPITAL ENCOUNTER (OUTPATIENT)
Dept: GENERAL RADIOLOGY | Age: 55
Discharge: HOME | End: 2020-07-24
Attending: SPECIALIST
Payer: MEDICAID

## 2020-07-24 DIAGNOSIS — M16.12 PRIMARY OSTEOARTHRITIS OF LEFT HIP: ICD-10-CM

## 2020-07-24 PROCEDURE — 74011000250 HC RX REV CODE- 250: Performed by: SPECIALIST

## 2020-07-24 PROCEDURE — 74011250636 HC RX REV CODE- 250/636: Performed by: SPECIALIST

## 2020-07-24 PROCEDURE — 74011636320 HC RX REV CODE- 636/320: Performed by: SPECIALIST

## 2020-07-24 PROCEDURE — 77002 NEEDLE LOCALIZATION BY XRAY: CPT

## 2020-07-24 RX ORDER — METHYLPREDNISOLONE ACETATE 40 MG/ML
40 INJECTION, SUSPENSION INTRA-ARTICULAR; INTRALESIONAL; INTRAMUSCULAR; SOFT TISSUE
Status: COMPLETED | OUTPATIENT
Start: 2020-07-24 | End: 2020-07-24

## 2020-07-24 RX ORDER — LIDOCAINE HYDROCHLORIDE 10 MG/ML
30 INJECTION, SOLUTION EPIDURAL; INFILTRATION; INTRACAUDAL; PERINEURAL
Status: COMPLETED | OUTPATIENT
Start: 2020-07-24 | End: 2020-07-24

## 2020-07-24 RX ADMIN — METHYLPREDNISOLONE ACETATE 40 MG: 40 INJECTION, SUSPENSION INTRA-ARTICULAR; INTRALESIONAL; INTRAMUSCULAR; SOFT TISSUE at 11:33

## 2020-07-24 RX ADMIN — LIDOCAINE HYDROCHLORIDE 8 ML: 10 INJECTION, SOLUTION EPIDURAL; INFILTRATION; INTRACAUDAL; PERINEURAL at 11:34

## 2020-07-24 RX ADMIN — IOPAMIDOL 3 ML: 408 INJECTION, SOLUTION INTRATHECAL at 11:33

## 2021-04-28 ENCOUNTER — OFFICE VISIT (OUTPATIENT)
Dept: ORTHOPEDIC SURGERY | Age: 56
End: 2021-04-28
Payer: COMMERCIAL

## 2021-04-28 VITALS
RESPIRATION RATE: 16 BRPM | OXYGEN SATURATION: 99 % | BODY MASS INDEX: 36.22 KG/M2 | HEIGHT: 70 IN | HEART RATE: 105 BPM | WEIGHT: 253 LBS | TEMPERATURE: 97.3 F

## 2021-04-28 DIAGNOSIS — M65.312 TRIGGER FINGER OF LEFT THUMB: Primary | ICD-10-CM

## 2021-04-28 DIAGNOSIS — M25.561 ACUTE PAIN OF RIGHT KNEE: ICD-10-CM

## 2021-04-28 DIAGNOSIS — M17.11 PRIMARY OSTEOARTHRITIS OF RIGHT KNEE: ICD-10-CM

## 2021-04-28 PROCEDURE — 76942 ECHO GUIDE FOR BIOPSY: CPT | Performed by: ORTHOPAEDIC SURGERY

## 2021-04-28 PROCEDURE — 20550 NJX 1 TENDON SHEATH/LIGAMENT: CPT | Performed by: ORTHOPAEDIC SURGERY

## 2021-04-28 PROCEDURE — 73560 X-RAY EXAM OF KNEE 1 OR 2: CPT | Performed by: ORTHOPAEDIC SURGERY

## 2021-04-28 PROCEDURE — 99214 OFFICE O/P EST MOD 30 MIN: CPT | Performed by: ORTHOPAEDIC SURGERY

## 2021-04-28 RX ORDER — CELECOXIB 200 MG/1
200 CAPSULE ORAL DAILY
Qty: 60 CAP | Refills: 0 | Status: SHIPPED | OUTPATIENT
Start: 2021-04-28

## 2021-04-28 RX ORDER — TRIAMCINOLONE ACETONIDE 40 MG/ML
40 INJECTION, SUSPENSION INTRA-ARTICULAR; INTRAMUSCULAR ONCE
Status: COMPLETED | OUTPATIENT
Start: 2021-04-28 | End: 2021-04-28

## 2021-04-28 RX ADMIN — TRIAMCINOLONE ACETONIDE 40 MG: 40 INJECTION, SUSPENSION INTRA-ARTICULAR; INTRAMUSCULAR at 16:50

## 2021-04-28 NOTE — PROGRESS NOTES
Myriam Treadwell  1965   Chief Complaint   Patient presents with    Thumb Pain     left    Knee Pain     right        HISTORY OF PRESENT ILLNESS  Myriam Treadwell is a 60 y.o. male who presents today for evaluation of left thumb pain. Patient rates pain as 7/10 today. Pain has been present for 2 weeks. Left thumb keeps locking on him. Having night pain. Pt is also complaining of right knee pain today. Pain is a 9/10 today. Pt is status post right knee arthroscopic partial medial menisectomy on 10/28/19. Right knee pain has worsened after getting surgery on the left hip, possibly due to overcompensation. Has tried cortisone injections for the right knee previously which did not provide lasting relief. Patient denies any fever, chills, chest pain, shortness of breath or calf pain. The remainder of the review of systems is negative. There are no new illness or injuries to report since last seen in the office. There are no changes to medications, allergies, family or social history. PHYSICAL EXAM:   Visit Vitals  Pulse (!) 105   Temp 97.3 °F (36.3 °C)   Resp 16   Ht 5' 10\" (1.778 m)   Wt 253 lb (114.8 kg)   SpO2 99%   BMI 36.30 kg/m²     The patient is a well-developed, well-nourished male   in no acute distress. The patient is alert and oriented times three. The patient is alert and oriented times three. Mood and affect are normal.  LYMPHATIC: lymph nodes are not enlarged and are within normal limits  SKIN: normal in color and non tender to palpation. There are no bruises or abrasions noted. NEUROLOGICAL: Motor sensory exam is within normal limits. Reflexes are equal bilaterally.  There is normal sensation to pinprick and light touch  MUSCULOSKELETAL:  Examination Right knee   Skin Intact   Range of motion 0-130   Effusion +   Medial joint line tenderness +   Lateral joint line tenderness -   Tenderness Pes Bursa -   Tenderness insertion MCL -   Tenderness insertion LCL -   Teres -   Patella crepitus +   Patella grind -   Lachman -   Pivot shift -   Anterior drawer -   Posterior drawer -   Varus stress -   Valgus stress -   Neurovascular Intact   Calf Swelling and Tenderness to Palpation -   Ying's Test -   Hamstring Cord Tightness -        Examination Left Hand   Skin Intact   Deformity -   Swelling -   Tenderness +thumb   Tenderness A1 Pulley +thumb   Finger flexion Full   Finger extension Full   Thenar Eminence Atrophy -   Sensation Normal   Capillary refill -   Heberden's nodes -   Dupuytren's -     Examination Left Wrist   Skin Intact   Tenderness -   Flexion 60   Extension 60   Deformity -   Effusion -   Tinnel's sign -   Phalen's test -   Finklestein maneuver -   Pain with thumb abduction -       PROCEDURE:  Left Thumb A1 Pulley Injection with Ultrasound Guidance    Indication:Left thumb pain/swelling    After sterile prep, 0.5 cc of Xylocaine and 0.5 cc of Kenalog were injected into the left thumb. intra-tendon sheath Ultrasound images captured using [facility] [employer] Loop Ultrasound machine using a frequency of 10 MHz with a linear transducer and scanned into patient's chart.            [facility] - [facility]  OFFICE PROCEDURE PROGRESS NOTE        Chart reviewed for the following:  Maribel Rodriguez M.D, have reviewed the History, Physical and updated the Allergic reactions for Jennifer Jauregui performed immediately prior to start of procedure:  Maribel Rodriguez M.D, have performed the following reviews on Cari Lockhart prior to the start of the procedure:            * Patient was identified by name and date of birth   * Agreement on procedure being performed was verified  * Risks and Benefits explained to the patient  * Procedure site verified and marked as necessary  * Patient was positioned for comfort  * Needle placement confirmed by ultrasound  * Consent was signed and verified     Time: 4:51 PM     Date of procedure: 4/29/2021    Procedure performed by:  Socorro Trimble M.D    Provider assisted by: (see medication administration)    How tolerated by patient: tolerated the procedure well with no complications    Comments: none      IMAGING: XR of right knee with 2 views obtained in the office dated 4/28/2021 was reviewed and read by Dr. Alexey Nam: marked degenerative changes in the medial compartment and patellofemoral joint      IMPRESSION:      ICD-10-CM ICD-9-CM    1. Trigger finger of left thumb  M65.312 727.03 celecoxib (CeleBREX) 200 mg capsule      triamcinolone acetonide (KENALOG-40) 40 mg/mL injection 40 mg      AMB POC US, SONO GUIDE NEEDLE      INJECT TENDON SHEATH/LIGAMENT   2. Acute pain of right knee  M25.561 719.46 AMB POC XRAY, KNEE; 1/2 VIEWS   3. Primary osteoarthritis of right knee  M17.11 715.16 celecoxib (CeleBREX) 200 mg capsule      PROCEDURE AUTHORIZATION TO         PLAN:   1. Pt presents today with left trigger thumb and I would like to try a cortisone injection. Pt also complains of right knee pain due to primary OA. He has failed cortisone injections previously and we will be proceeding with Euflexxa authorization for the right knee. Was also given prescription for Celebrex today. Risk factors include: htn, BMI>35  2. No ultrasound exam indicated today  3. Yes cortisone injection indicated today L THUMB A1 PULLEY US  4. No Physical/Occupational Therapy indicated today  5. No diagnostic test indicated today:   6. No durable medical equipment indicated today  7. No referral indicated today   8. Yes medications indicated today: CELEBREX  9. No Narcotic indicated today       RTC following Euflexxa auth      Scribed by Aldo Miguel [address]) as dictated by Socorro Trimble MD    I, Dr. Socorro Trimble, confirm that all documentation is accurate.     Socorro Trimble M.D.   [facility]

## 2021-04-29 ENCOUNTER — TELEPHONE (OUTPATIENT)
Dept: ORTHOPEDIC SURGERY | Age: 56
End: 2021-04-29

## 2021-04-29 NOTE — TELEPHONE ENCOUNTER
Patient was advised by Carloyn Olszewski that prior auth is required for rx  celecoxib (CeleBREX) 200 mg capsule. Please initiate for patient to fill.

## 2021-04-30 RX ORDER — MELOXICAM 15 MG/1
15 TABLET ORAL
Qty: 30 TAB | Refills: 2 | Status: SHIPPED | OUTPATIENT
Start: 2021-04-30

## 2021-04-30 NOTE — TELEPHONE ENCOUNTER
Tried to submit via [employer] and received this response:  Outcome  Additional Information Required  A new prior authorization (PA) request cannot be started at this time because this member has other primary health insurance. Please contact the PA call center if you have questions on the status of this request.    I spoke with the pharmacy and verified the only insurance is [facility]. I contacted patient, name and  were verified. I confirmed with patient this is the only coverage he has. I am unsure what are the next steps to resolve this. Please advise.

## 2021-05-04 ENCOUNTER — DOCUMENTATION ONLY (OUTPATIENT)
Dept: ORTHOPEDIC SURGERY | Age: 56
End: 2021-05-04

## 2021-05-05 ENCOUNTER — OFFICE VISIT (OUTPATIENT)
Dept: ORTHOPEDIC SURGERY | Age: 56
End: 2021-05-05
Payer: COMMERCIAL

## 2021-05-05 VITALS
TEMPERATURE: 97.1 F | WEIGHT: 252 LBS | HEIGHT: 70 IN | HEART RATE: 91 BPM | RESPIRATION RATE: 18 BRPM | BODY MASS INDEX: 36.08 KG/M2 | OXYGEN SATURATION: 98 %

## 2021-05-05 DIAGNOSIS — M17.11 PRIMARY OSTEOARTHRITIS OF RIGHT KNEE: Primary | ICD-10-CM

## 2021-05-05 PROCEDURE — 20611 DRAIN/INJ JOINT/BURSA W/US: CPT | Performed by: ORTHOPAEDIC SURGERY

## 2021-05-05 NOTE — PROGRESS NOTES
Patient: Miky Mcguire                MRN: [id number]       SSN: [id number]  YOB: 1965        AGE: 60 y.o. SEX: male  Body mass index is 36.16 kg/m². PCP: Teresa Munoz MD  05/05/21    Chief Complaint   Patient presents with    Knee Pain     right knee pain       HISTORY:  Miky Mcguire is a 60 y.o. male who is seen for reevaluation of Right knee and here for 1st injection of Euflexxa. PROCEDURE:  Under ultrasound guidance, patient's Right knee, after timeout under sterile conditions, was injected with 2 cc of Euflexxa. Intra-articular. Ultrasound images captured using [employer] Ultrasound machine using a frequency of 10 MHz with a linear transducer and scanned into patient's chart. [facility] - [facility]  OFFICE PROCEDURE PROGRESS NOTE        Chart reviewed for the following:   Domingo Giles MD, have reviewed the History, Physical and updated the Allergic reactions for [address] [place] [address] Avenue performed immediately prior to start of procedure:   Domingo Giles MD, have performed the following reviews on Miky Mcguire prior to the start of the procedure:            * Patient was identified by name and date of birth   * Agreement on procedure being performed was verified  * Risks and Benefits explained to the patient  * Procedure site verified and marked as necessary  * Patient was positioned for comfort  * Needle placement confirmed by ultrasound  * Consent was signed and verified     Time: 10:39 AM       Date of procedure: 5/5/2021    Procedure performed by:  Aleksander Hubbard MD    Provider assisted by: None     How tolerated by patient: tolerated the procedure well with no complications    Comments: none    IMPRESSION:     ICD-10-CM ICD-9-CM    1.  Primary osteoarthritis of right knee  M17.11 715.16 sodium hyaluronate (SUPARTZ FX/EUFLEXXA/HYALGAN) 10 mg/mL injection syrg 20 mg ARTHROCENTESIS ASPIR&/INJ MAJOR JT/BURSA W/US        PLAN:  Mr. Zelda Callahan will return in one week for his second Euflexxa injection. Scribed by Yuko Sood) as dictated by Olman Baum MD    I, Dr. Olman Baum, confirm that all documentation is accurate.     Olman Baum M.D.   [facility]

## 2021-05-19 ENCOUNTER — OFFICE VISIT (OUTPATIENT)
Dept: ORTHOPEDIC SURGERY | Age: 56
End: 2021-05-19
Payer: COMMERCIAL

## 2021-05-19 VITALS
HEART RATE: 93 BPM | TEMPERATURE: 96.9 F | WEIGHT: 250 LBS | OXYGEN SATURATION: 99 % | RESPIRATION RATE: 16 BRPM | HEIGHT: 70 IN | BODY MASS INDEX: 35.79 KG/M2

## 2021-05-19 DIAGNOSIS — M17.11 PRIMARY OSTEOARTHRITIS OF RIGHT KNEE: Primary | ICD-10-CM

## 2021-05-19 PROCEDURE — 20611 DRAIN/INJ JOINT/BURSA W/US: CPT | Performed by: ORTHOPAEDIC SURGERY

## 2021-05-19 NOTE — PROGRESS NOTES
Patient: Sobeida Garcia                MRN: [id number]       SSN: [id number]  YOB: 1965        AGE: 60 y.o. SEX: male  Body mass index is 35.87 kg/m². PCP: Damion Cruz MD  05/19/21    Chief Complaint   Patient presents with    Knee Pain     right       HISTORY:  Sobeida Garcia is a 60 y.o. male who is seen for reevaluation of Right knee and here for 2nd injection of Euflexxa. PROCEDURE:  Under ultrasound guidance, patient's Right knee, after timeout under sterile conditions, was injected with 2 cc of Euflexxa. Intra-articular. Ultrasound images captured using [employer] Loop Ultrasound machine using a frequency of 10 MHz with a linear transducer and scanned into patient's chart. [facility]  OFFICE PROCEDURE PROGRESS NOTE        Chart reviewed for the following:   Vinny Longo MD, have reviewed the History, Physical and updated the Allergic reactions for [address] performed immediately prior to start of procedure:   Vinny Longo MD, have performed the following reviews on Sobeida Garcia prior to the start of the procedure:            * Patient was identified by name and date of birth   * Agreement on procedure being performed was verified  * Risks and Benefits explained to the patient  * Procedure site verified and marked as necessary  * Patient was positioned for comfort  * Needle placement confirmed by ultrasound  * Consent was signed and verified     Time: 1:01 PM    Date of procedure: 5/19/2021    Procedure performed by:  Julisa Roque MD    Provider assisted by: None     How tolerated by patient: tolerated the procedure well with no complications    Comments: none    IMPRESSION:     ICD-10-CM ICD-9-CM    1.  Primary osteoarthritis of right knee  M17.11 715.16 sodium hyaluronate (SUPARTZ FX/EUFLEXXA/HYALGAN) 10 mg/mL injection syrg 20 mg      ARTHROCENTESIS ASPIR&/INJ MAJOR JT/BURSA W/US        PLAN:  Mr. Jenny Mead will return in one week for his third Euflexxa injection. Sc[facility] by [facility] as dictated by MD RAVINDER Aponte, Dr. Yvonne Norton, confirm that all documentation is accurate.     Yvonne Norton M.D.   [facility]

## 2021-05-26 ENCOUNTER — OFFICE VISIT (OUTPATIENT)
Dept: ORTHOPEDIC SURGERY | Age: 56
End: 2021-05-26
Payer: COMMERCIAL

## 2021-05-26 VITALS
TEMPERATURE: 96.9 F | BODY MASS INDEX: 36.08 KG/M2 | WEIGHT: 252 LBS | OXYGEN SATURATION: 99 % | HEIGHT: 70 IN | HEART RATE: 97 BPM

## 2021-05-26 DIAGNOSIS — M17.11 PRIMARY OSTEOARTHRITIS OF RIGHT KNEE: Primary | ICD-10-CM

## 2021-05-26 PROCEDURE — 20611 DRAIN/INJ JOINT/BURSA W/US: CPT | Performed by: PHYSICIAN ASSISTANT

## 2021-05-26 NOTE — PROGRESS NOTES
Patient: Efra Man                MRN: [id number]       SSN: [id number]  YOB: 1965        AGE: 60 y.o. SEX: male  Body mass index is 36.16 kg/m². PCP: Aurelio Thomson MD  05/26/21    Chief Complaint   Patient presents with    Knee Pain     right       HISTORY:  Efra Man is a 60 y.o. male who is seen for Right knee and third Euflexxa injection. PROCEDURE:  Under ultrasound guidance, the patient's Right knee, after timeout under sterile conditions, was injected with 2 cc of Euflexxa. Intra-articular. Ultrasound images captured using [employer] Ultrasound machine using a frequency of 10 MHz with a linear transducer and scanned into patient's chart. [facility] - [facility]  OFFICE PROCEDURE PROGRESS NOTE        Chart reviewed for the following:   Albert CASTELLON PA-C, have reviewed the History, Physical and updated the Allergic reactions for [facility] [address] performed immediately prior to start of procedure:   Albert CASTELLON PA-C, have performed the following reviews on Efra Man prior to the start of the procedure:            * Patient was identified by name and date of birth   * Agreement on procedure being performed was verified  * Risks and Benefits explained to the patient  * Procedure site verified and marked as necessary  * Patient was positioned for comfort  * Needle placement confirmed by ultrasound  * Consent was signed and verified     Time: 1:39 PM       Date of procedure: 5/26/2021    Procedure performed by:  Albert Parker PA-C    Provider assisted by: None     How tolerated by patient: tolerated the procedure well with no complications    Comments: none    IMPRESSION:     ICD-10-CM ICD-9-CM    1.  Primary osteoarthritis of right knee  M17.11 715.16 sodium hyaluronate (SUPARTZ FX/EUFLEXXA/HYALGAN) 10 mg/mL injection syrg 20 mg      ARTHROCENTESIS ASPIR&/INJ MAJOR JT/BURSA W/US        PLAN:  Brii has completed his Euflexxa injection series. he will return as needed.     Scribed by Ish Sepulveda) as dictated by LINO Miguel PA-C [facility]

## 2021-06-18 ENCOUNTER — HOSPITAL ENCOUNTER (OUTPATIENT)
Dept: LAB | Age: 56
Discharge: HOME | End: 2021-06-18

## 2021-06-18 LAB — SENTARA SPECIMEN COL,SENBCF: NORMAL

## 2021-06-18 PROCEDURE — 99001 SPECIMEN HANDLING PT-LAB: CPT

## 2021-11-30 ENCOUNTER — OFFICE VISIT (OUTPATIENT)
Dept: ORTHOPEDIC SURGERY | Age: 56
End: 2021-11-30
Payer: COMMERCIAL

## 2021-11-30 VITALS
TEMPERATURE: 96.8 F | OXYGEN SATURATION: 97 % | BODY MASS INDEX: 36.28 KG/M2 | HEART RATE: 97 BPM | HEIGHT: 70 IN | WEIGHT: 253.4 LBS

## 2021-11-30 DIAGNOSIS — G89.29 CHRONIC PAIN OF LEFT KNEE: ICD-10-CM

## 2021-11-30 DIAGNOSIS — M17.12 PRIMARY OSTEOARTHRITIS OF LEFT KNEE: Primary | ICD-10-CM

## 2021-11-30 DIAGNOSIS — M25.562 CHRONIC PAIN OF LEFT KNEE: ICD-10-CM

## 2021-11-30 DIAGNOSIS — M17.11 PRIMARY OSTEOARTHRITIS OF RIGHT KNEE: ICD-10-CM

## 2021-11-30 PROCEDURE — 20611 DRAIN/INJ JOINT/BURSA W/US: CPT | Performed by: ORTHOPAEDIC SURGERY

## 2021-11-30 PROCEDURE — 99214 OFFICE O/P EST MOD 30 MIN: CPT | Performed by: ORTHOPAEDIC SURGERY

## 2021-11-30 PROCEDURE — 73560 X-RAY EXAM OF KNEE 1 OR 2: CPT | Performed by: ORTHOPAEDIC SURGERY

## 2021-11-30 RX ORDER — TRIAMCINOLONE ACETONIDE 40 MG/ML
40 INJECTION, SUSPENSION INTRA-ARTICULAR; INTRAMUSCULAR ONCE
Status: COMPLETED | OUTPATIENT
Start: 2021-11-30 | End: 2021-11-30

## 2021-11-30 RX ADMIN — TRIAMCINOLONE ACETONIDE 40 MG: 40 INJECTION, SUSPENSION INTRA-ARTICULAR; INTRAMUSCULAR at 14:33

## 2021-11-30 NOTE — PROGRESS NOTES
Liudmila Davis  1965   Chief Complaint   Patient presents with    Knee Pain     Bilat        HISTORY OF PRESENT ILLNESS  Liudmila Davis is a 60 y.o. male who presents today for reevaluation of b/l knee pain. R>L Patient rates pain as 8/10 today. Pt finished a series of euflexxa for the right knee in May. He did not have relief for this. Previous surgery in 2019 on his right knee for the medial meniscus. Has pain when his knees are hanging, walking, sitting. Patient denies any fever, chills, chest pain, shortness of breath or calf pain. The remainder of the review of systems is negative. There are no new illness or injuries to report since last seen in the office. There are no changes to medications, allergies, family or social history. PHYSICAL EXAM:   Visit Vitals  Pulse 97   Temp 96.8 °F (36 °C) (Temporal)   Ht 5' 10\" (1.778 m)   Wt 253 lb 6.4 oz (114.9 kg)   SpO2 97%   BMI 36.36 kg/m²     The patient is a well-developed, well-nourished male   in no acute distress. The patient is alert and oriented times three. The patient is alert and oriented times three. Mood and affect are normal.  LYMPHATIC: lymph nodes are not enlarged and are within normal limits  SKIN: normal in color and non tender to palpation. There are no bruises or abrasions noted. NEUROLOGICAL: Motor sensory exam is within normal limits. Reflexes are equal bilaterally.  There is normal sensation to pinprick and light touch  MUSCULOSKELETAL:  Examination Right knee   Skin Intact   Range of motion 0-130   Effusion +   Medial joint line tenderness +   Lateral joint line tenderness -   Tenderness Pes Bursa -   Tenderness insertion MCL -   Tenderness insertion LCL -   Teres -   Patella crepitus +   Patella grind -   Lachman -   Pivot shift -   Anterior drawer -   Posterior drawer -   Varus stress -   Valgus stress -   Neurovascular Intact   Calf Swelling and Tenderness to Palpation -   Ying's Test -   Hamstring Cord Tightness -     Examination Left knee   Skin Intact   Range of motion 0-130   Effusion +   Medial joint line tenderness +   Lateral joint line tenderness -   Tenderness Pes Bursa -   Tenderness insertion MCL -   Tenderness insertion LCL -   Teres -   Patella crepitus +   Patella grind +   Lachman -   Pivot shift -   Anterior drawer -   Posterior drawer -   Varus stress -   Valgus stress -   Neurovascular Intact   Calf Swelling and Tenderness to Palpation -   Ying's Test -   Hamstring Cord Tightness -         PROCEDURE:   Left knee Injection with Ultrasound Guidance    Indication:Left Knee pain/swelling    After sterile prep, 4 cc of Xylocaine and 1 cc of Kenalog were injected into the left Knee. Intra-articular Ultrasound images captured using [address] Ultrasound machine using a frequency of 10 MHz with a linear transducer and scanned into patient's chart.            [facility] - [facility]  OFFICE PROCEDURE PROGRESS NOTE        Chart reviewed for the following:  Genny Zamorano M.D, have reviewed the History, Physical and updated the Allergic reactions for [address] performed immediately prior to start of procedure:  Genny Zamorano M.D, have performed the following reviews on Heddie Est[employer] prior to the start of the procedure:            * Patient was identified by name and date of birth   * Agreement on procedure being performed was verified  * Risks and Benefits explained to the patient  * Procedure site verified and marked as necessary  * Patient was positioned for comfort  * Needle placement confirmed by ultrasound  * Consent was signed and verified     Time: 2:19 PM     Date of procedure: 11/30/2021    Procedure performed by:  Vona Pallas, M.D    Provider assisted by: (see medication administration)    How tolerated by patient: tolerated the procedure well with no complications    Comments: none      IMAGING: XR of the left knee with 2 views obtained in the office dated 11/30/2021 was reviewed and read by Dr. Isabelle Taylor:  Right: Severe, end stage degenerative arthritis  Left: Marked decreased joint space on the medial compartment with varrus angulation. Of note an exostosis        XR of right knee with 2 views obtained in the office dated 4/28/2021 was reviewed and read by Dr. Isabelle Taylor: marked degenerative changes in the medial compartment and patellofemoral joint      IMPRESSION:      ICD-10-CM ICD-9-CM    1. Primary osteoarthritis of left knee  M17.12 715.16    2. Chronic pain of left knee  M25.562 719.46 AMB POC XRAY, KNEE; 1/2 VIEWS    G89.29 338.29    3. Primary osteoarthritis of right knee  M17.11 715.16         PLAN:   1. Pt presents today with b/l knee pain due to degenerative arthritis with joint space narrowing. Due to the lack of relief from surgical intervention and gel injections, I explained the next step would be to see Dr. Lovell Scheuermann. I am hopeful a left knee cortisone injection will provide relief. Return in 3 weeks if left knee pain continues   Risk factors include: htn, BMI>35  2. No ultrasound exam indicated today  3. Yes cortisone injection indicated today L KNEE US   4. No Physical/Occupational Therapy indicated today  5. No diagnostic test indicated today:   6. No durable medical equipment indicated today  7. No referral indicated today   8. No medications indicated today:  9. No Narcotic indicated today       RTC 3 weeks if pain continues       Scribed by Fidencio Guallpa [address]) as dictated by Qiana Garcia MD    I, Dr. Qiana Garcia, confirm that all documentation is accurate.     Qiana Garcia M.D.   [facility]

## 2022-03-18 PROBLEM — E66.01 SEVERE OBESITY (HCC): Status: ACTIVE | Noted: 2019-07-30

## 2022-12-19 ENCOUNTER — OFFICE VISIT (OUTPATIENT)
Dept: ORTHOPEDIC SURGERY | Age: 57
End: 2022-12-19
Payer: COMMERCIAL

## 2022-12-19 VITALS — HEIGHT: 70 IN | TEMPERATURE: 97.8 F | WEIGHT: 253 LBS | BODY MASS INDEX: 36.22 KG/M2

## 2022-12-19 DIAGNOSIS — M17.11 PRIMARY OSTEOARTHRITIS OF RIGHT KNEE: ICD-10-CM

## 2022-12-19 DIAGNOSIS — M17.12 PRIMARY OSTEOARTHRITIS OF LEFT KNEE: Primary | ICD-10-CM

## 2022-12-19 RX ORDER — TRIAMCINOLONE ACETONIDE 40 MG/ML
40 INJECTION, SUSPENSION INTRA-ARTICULAR; INTRAMUSCULAR ONCE
Status: COMPLETED | OUTPATIENT
Start: 2022-12-19 | End: 2022-12-20

## 2022-12-19 NOTE — PROGRESS NOTES
Kell Calvillo  1965   Chief Complaint   Patient presents with    Knee Pain     Lt           HISTORY OF PRESENT ILLNESS  Kell Calvillo is a 59 y.o. male who presents today for reevaluation of b/l knee. L>R. Patient rates pain as 10/10 today. Was last seen here in the office on 11/30/2021 at which time he received left knee cortisone injection. Notes returning left knee pain today. Pt finished a series of euflexxa for the right knee in May 2021. He did not have relief for this. Previous surgery in 2019 on his right knee for the medial meniscus. Patient denies any fever, chills, chest pain, shortness of breath or calf pain. The remainder of the review of systems is negative. There are no new illness or injuries to report since last seen in the office. There are no changes to medications, allergies, family or social history. PHYSICAL EXAM:   Visit Vitals  Temp 97.8 °F (36.6 °C) (Temporal)   Ht 5' 10\" (1.778 m)   Wt 253 lb (114.8 kg)   BMI 36.30 kg/m²     The patient is a well-developed, well-nourished male   in no acute distress. The patient is alert and oriented times three. The patient is alert and oriented times three. Mood and affect are normal.  LYMPHATIC: lymph nodes are not enlarged and are within normal limits  SKIN: normal in color and non tender to palpation. There are no bruises or abrasions noted. NEUROLOGICAL: Motor sensory exam is within normal limits. Reflexes are equal bilaterally.  There is normal sensation to pinprick and light touch  MUSCULOSKELETAL:  Examination Right knee   Skin Intact   Range of motion 0-130   Effusion +   Medial joint line tenderness +   Lateral joint line tenderness -   Tenderness Pes Bursa -   Tenderness insertion MCL -   Tenderness insertion LCL -   Teres -   Patella crepitus +   Patella grind -   Lachman -   Pivot shift -   Anterior drawer -   Posterior drawer -   Varus stress -   Valgus stress -   Neurovascular Intact   Calf Swelling and Tenderness to Palpation -   Ying's Test -   Hamstring Cord Tightness -      Examination Left knee   Skin Intact   Range of motion 0-130   Effusion +   Medial joint line tenderness +   Lateral joint line tenderness -   Tenderness Pes Bursa -   Tenderness insertion MCL -   Tenderness insertion LCL -   Teres -   Patella crepitus +   Patella grind +   Lachman -   Pivot shift -   Anterior drawer -   Posterior drawer -   Varus stress -   Valgus stress -   Neurovascular Intact   Calf Swelling and Tenderness to Palpation -   Ying's Test -   Hamstring Cord Tightness -       PROCEDURE:   Left Knee Injection with Ultrasound Guidance    Indication:Left Knee pain/swelling    After sterile prep, 4 cc of Xylocaine and 1 cc of Kenalog were injected into the left Knee. Intra-articular Ultrasound images captured using [employer] Ultrasound machine using a frequency of 10 MHz with a linear transducer and scanned into patient's chart.            [facility] - [facility]  OFFICE PROCEDURE PROGRESS NOTE        Chart reviewed for the following:  Noemy Merida M.D, have reviewed the History, Physical and updated the Allergic reactions for [address] [place] [address] performed immediately prior to start of procedure:  Noemy Merida M.D, have performed the following reviews on [facility] prior to the start of the procedure:            * Patient was identified by name and date of birth   * Agreement on procedure being performed was verified  * Risks and Benefits explained to the patient  * Procedure site verified and marked as necessary  * Patient was positioned for comfort  * Needle placement confirmed by ultrasound  * Consent was signed and verified     Time: 4:14 PM     Date of procedure: 12/19/2022    Procedure performed by:  Theressa Bamberger, M.D    Provider assisted by: (see medication administration)    How tolerated by patient: tolerated the procedure well with no complications    Comments: none      IMAGING: XR of the left knee with 2 views obtained in the office dated 12/19/2022 was reviewed and read by Dr. Kassie Christian: Marked decreased joint space in the medial compartment and the patellofemoral joint    XR of the left knee with 2 views obtained in the office dated 11/30/2021 was reviewed and read by Dr. Kassie Christian:  Right: Severe, end stage degenerative arthritis  Left: Marked decreased joint space on the medial compartment with varrus angulation. Of note an exostosis          XR of right knee with 2 views obtained in the office dated 4/28/2021 was reviewed and read by Dr. Kassie Christian: marked degenerative changes in the medial compartment and patellofemoral joint       IMPRESSION:      ICD-10-CM ICD-9-CM    1. Primary osteoarthritis of left knee  M17.12 715.16 AMB POC XRAY, KNEE; 1/2 VIEWS      2. Primary osteoarthritis of right knee  M17.11 715.16            PLAN:   1. Pt presents today with returning left knee pain due to primary OA and I would like to try a cortisone injection in the office. Risk factors include: htn, BMI>35  2. No ultrasound exam indicated today  3. Yes cortisone injection indicated today L KNEE US  4. No Physical/Occupational Therapy indicated today  5. No diagnostic test indicated today:   6. No durable medical equipment indicated today  7. No referral indicated today   8. No medications indicated today:   9. No Narcotic indicated today       RTC 3 weeks if pain continues      Scribed by Mati Holder [address]) as dictated by Justus Conway MD    I, Dr. Justus Conway, confirm that all documentation is accurate.     Justus Conway M.D.   [facility]

## 2022-12-20 RX ADMIN — TRIAMCINOLONE ACETONIDE 40 MG: 40 INJECTION, SUSPENSION INTRA-ARTICULAR; INTRAMUSCULAR at 16:36

## 2023-07-06 ENCOUNTER — OFFICE VISIT (OUTPATIENT)
Age: 58
End: 2023-07-06

## 2023-07-06 DIAGNOSIS — M17.11 UNILATERAL PRIMARY OSTEOARTHRITIS, RIGHT KNEE: Primary | ICD-10-CM

## 2023-07-06 DIAGNOSIS — M25.461 EFFUSION, RIGHT KNEE: ICD-10-CM

## 2023-07-06 RX ORDER — BETAMETHASONE SODIUM PHOSPHATE AND BETAMETHASONE ACETATE 3; 3 MG/ML; MG/ML
3 INJECTION, SUSPENSION INTRA-ARTICULAR; INTRALESIONAL; INTRAMUSCULAR; SOFT TISSUE ONCE
Status: COMPLETED | OUTPATIENT
Start: 2023-07-06 | End: 2023-07-06

## 2023-07-06 RX ADMIN — BETAMETHASONE SODIUM PHOSPHATE AND BETAMETHASONE ACETATE 3 MG: 3; 3 INJECTION, SUSPENSION INTRA-ARTICULAR; INTRALESIONAL; INTRAMUSCULAR; SOFT TISSUE at 16:29

## 2023-07-06 NOTE — PROGRESS NOTES
Patient: Светлана Pacheco                MRN: [id number]       SSN: [id number]  YOB: 1965        AGE: 59 y.o. SEX: male      PCP: April Abarca MD  07/06/23    Chief Complaint   Patient presents with    Knee Pain     Right knee        HISTORY:  Светлана Pacheco is a 59 y.o. male who is seen for increased right knee pain related to a work injury. He tripped and fell over a tree stump while working on 3/22/23. He landed directly on his right knee. Since his fall he has been experiencing sharp right knee pain and swelling. He was previously treated by Dr. Darby Saxena. He is s/p right knee arthroscopy with medial menisectomy  in 2019. He was previously seen for bilateral thumb & left hip pains. He states that his hip started hurting after Dr. Christopher Ulloa examined his left hip. He states that he walked out of Dr. Marian Ignacio office with a limp. Previous hip cortisone  injections did not help as much as he would have liked. He is s/p right knee arthroscopy by Dr. Darby Saxena. 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 He does work on the drainage systems and roadside maintenance. He lives with his wife in [facility]. His wife is also a [facility] patient and is scheduled for surgery with [facility] in December 2023. He has 5 adult daughters, 1 adult son, and 14  grandchildren. He's from [facility]. He is not diabetic. He is hypertensive. He gained weight recently. Mr. Sis Fernandes weighs 253 lbs and is 5'10\" tall. Wt Readings from Last 3 Encounters:   12/19/22 253 lb (114.8 kg)   11/30/21 253 lb 6.4 oz (114.9 kg)   05/26/21 252 lb (114.3 kg)      There is no height or weight on file to calculate BMI.     Patient Active Problem List   Diagnosis    Severe obesity ([address])       Social History     Tobacco Use    Smoking status: Every Day     Packs/day: 0.25     Years: 10.00     Pack years: 2.50     Types: Cigarettes    Smokeless tobacco:

## 2023-07-24 ENCOUNTER — TELEPHONE (OUTPATIENT)
Age: 58
End: 2023-07-24

## 2023-07-24 NOTE — TELEPHONE ENCOUNTER
Patient states he has questions about his work restrictions, and is asking to speak to Dr. Enrique Kat or a nurse. Patient can be reached at [phone].

## 2023-07-24 NOTE — TELEPHONE ENCOUNTER
Spoke with pt--he wanted to let Dr Martin Cuello know that company MD would only honor Dr Luis Guerrero permanent restrictions for 2 weeks then he returned pt to full work activities--  FYI informed Dr Martin Cuello--  also pts visco injections are approved--he will make appt to start

## 2023-08-03 ENCOUNTER — TELEPHONE (OUTPATIENT)
Age: 58
End: 2023-08-03

## 2023-08-03 NOTE — TELEPHONE ENCOUNTER
Patient wanted to let the doctor know that he fell again at work today, 8/3 around 8:45am. Patient was seen by employee medical.     Left lower back, side of right knee, thumb of right hand. Wearing a brace on right hand for thumb. With x-rays of these areas.

## 2023-08-07 ENCOUNTER — OFFICE VISIT (OUTPATIENT)
Age: 58
End: 2023-08-07
Payer: COMMERCIAL

## 2023-08-07 VITALS — TEMPERATURE: 98 F | WEIGHT: 253 LBS | BODY MASS INDEX: 36.22 KG/M2 | HEIGHT: 70 IN

## 2023-08-07 DIAGNOSIS — M17.12 UNILATERAL PRIMARY OSTEOARTHRITIS, LEFT KNEE: ICD-10-CM

## 2023-08-07 DIAGNOSIS — M17.11 UNILATERAL PRIMARY OSTEOARTHRITIS, RIGHT KNEE: Primary | ICD-10-CM

## 2023-08-07 PROCEDURE — 20610 DRAIN/INJ JOINT/BURSA W/O US: CPT | Performed by: SPECIALIST

## 2023-08-07 RX ORDER — HYALURONATE SODIUM 10 MG/ML
20 SYRINGE (ML) INTRAARTICULAR ONCE
Status: COMPLETED | OUTPATIENT
Start: 2023-08-07 | End: 2023-08-07

## 2023-08-07 RX ORDER — FUROSEMIDE 40 MG/1
TABLET ORAL
COMMUNITY

## 2023-08-07 RX ORDER — FUROSEMIDE 40 MG/1
40 TABLET ORAL DAILY
COMMUNITY
Start: 2023-07-28

## 2023-08-07 RX ORDER — ETODOLAC 400 MG/1
TABLET, FILM COATED ORAL
COMMUNITY
Start: 2023-08-03 | End: 2023-08-13

## 2023-08-07 RX ORDER — FUROSEMIDE 20 MG/1
20 TABLET ORAL DAILY
COMMUNITY
Start: 2023-05-29

## 2023-08-07 RX ORDER — DULOXETIN HYDROCHLORIDE 30 MG/1
CAPSULE, DELAYED RELEASE ORAL
COMMUNITY
Start: 2023-05-11

## 2023-08-07 RX ORDER — LISINOPRIL 10 MG/1
TABLET ORAL
COMMUNITY

## 2023-08-07 RX ORDER — LOSARTAN POTASSIUM 50 MG/1
TABLET ORAL
COMMUNITY

## 2023-08-07 RX ADMIN — Medication 20 MG: at 15:08

## 2023-08-18 ENCOUNTER — OFFICE VISIT (OUTPATIENT)
Age: 58
End: 2023-08-18

## 2023-08-18 DIAGNOSIS — M17.11 UNILATERAL PRIMARY OSTEOARTHRITIS, RIGHT KNEE: Primary | ICD-10-CM

## 2023-08-18 DIAGNOSIS — M17.12 UNILATERAL PRIMARY OSTEOARTHRITIS, LEFT KNEE: ICD-10-CM

## 2023-08-18 RX ORDER — HYALURONATE SODIUM 10 MG/ML
20 SYRINGE (ML) INTRAARTICULAR ONCE
Status: COMPLETED | OUTPATIENT
Start: 2023-08-18 | End: 2023-08-18

## 2023-08-18 RX ADMIN — Medication 20 MG: at 14:14

## 2023-08-18 RX ADMIN — Medication 20 MG: at 14:13

## 2023-08-18 NOTE — PROGRESS NOTES
Patient: Bora Crespo                MRN: [id number]       SSN: [id number]  YOB: 1965        AGE: 59 y.o. SEX: male  There is no height or weight on file to calculate BMI. PCP: Macho Saenz MD  08/18/23    Chief Complaint   Patient presents with    Knee Pain     Bilateral Euflexxa #2      HISTORY:  Bora Crespo is a 59 y.o. male who is seen for bilateral knee pain. He presents today for his second injection in the Euflexxa visco supplementation series. ICD-10-CM    1. Unilateral primary osteoarthritis, right knee  M17.11 DRAIN/INJECT LARGE JOINT/BURSA     sodium hyaluronate (EUFLEXXA, HYALGAN) injection 20 mg      2. Unilateral primary osteoarthritis, left knee  M17.12 DRAIN/INJECT LARGE JOINT/BURSA     sodium hyaluronate (EUFLEXXA, HYALGAN) injection 20 mg        PROCEDURE:  Mr. Correia's knees were injected with 2 cc of Euflexxa. Chart reviewed for the following:   Alvarez Rodriguez MD, have reviewed the History, Physical and updated the Allergic reactions for [address] performed immediately prior to start of procedure:  I, Imani Blevins MD, have performed the following reviews on Bora Crespo prior to the start of the procedure:            * Patient was identified by name and date of birth   * Agreement on procedure being performed was verified  * Risks and Benefits explained to the patient  * Procedure site verified and marked as necessary  * Patient was positioned for comfort  * Consent was obtained     Time: 2:02 PM     Date of procedure: 8/18/2023    Procedure performed by:  Imani Blevins MD    Mr. Ryan Patel tolerated the procedure well with no complications. PLAN:  Mr. Ryan Patel will follow up in one week to complete his visco supplementation injection series.

## 2023-08-25 ENCOUNTER — OFFICE VISIT (OUTPATIENT)
Age: 58
End: 2023-08-25

## 2023-08-25 VITALS — WEIGHT: 253 LBS | HEIGHT: 70 IN | BODY MASS INDEX: 36.22 KG/M2 | TEMPERATURE: 97.8 F

## 2023-08-25 DIAGNOSIS — M17.12 UNILATERAL PRIMARY OSTEOARTHRITIS, LEFT KNEE: ICD-10-CM

## 2023-08-25 DIAGNOSIS — M17.11 UNILATERAL PRIMARY OSTEOARTHRITIS, RIGHT KNEE: Primary | ICD-10-CM

## 2023-08-25 RX ORDER — HYALURONATE SODIUM 10 MG/ML
20 SYRINGE (ML) INTRAARTICULAR ONCE
Status: COMPLETED | OUTPATIENT
Start: 2023-08-25 | End: 2023-08-25

## 2023-08-25 RX ORDER — HYDROCODONE BITARTRATE AND ACETAMINOPHEN 7.5; 325 MG/1; MG/1
TABLET ORAL
COMMUNITY
Start: 2023-08-18

## 2023-08-25 RX ADMIN — Medication 20 MG: at 14:59

## 2023-08-25 RX ADMIN — Medication 20 MG: at 15:00

## 2023-12-04 ENCOUNTER — TELEPHONE (OUTPATIENT)
Age: 58
End: 2023-12-04

## 2023-12-04 NOTE — TELEPHONE ENCOUNTER
Patient called and wanted to inform Dr. Hensley that Pain Management- [facility] is asking how may gel injection does he get.    He also stated that Dr. Hensley informed him that'll he's holding off on surgery until further notice.    He's requesting for all of that information be faxed over to [facility].    Fax: [phone].

## 2024-01-19 ENCOUNTER — TELEPHONE (OUTPATIENT)
Age: 59
End: 2024-01-19

## 2024-01-19 NOTE — TELEPHONE ENCOUNTER
Patient called to follow up on auth for inj series, currently covered under [employer]. Please advise pt at [phone].

## 2024-01-25 NOTE — PROGRESS NOTES
Patient: Jake Correia                MRN: [id number]       SSN: [id number]  YOB: 1965        AGE: 58 y.o.        SEX: male      PCP: Mykel Bryan MD  01/29/24    Chief Complaint   Patient presents with    Knee Pain     bilateral     HISTORY:  Jake Correia is a 58 y.o. male who is seen for bilateral knee pain. He presents today for his first injection in the Synvisc visco supplementation series.    He was previously seen for increased bilateral knee pain (R>L). 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 He successfully completed a bilateral Euflexxa series on 8/25/23 but his pains returned after his recent injury.      He was previously seen for right knee pain related to a prior work injury. He tripped and fell over a tree stump while working on 3/22/23.  He landed directly on his right knee. Since his fall he has been experiencing sharp right knee pain and swelling.  He was previously treated by Dr. Manzano. He is s/p right knee arthroscopy with medial menisectomy  in 2019.        He was previously seen for bilateral thumb & left hip pains.  He states that his hip started hurting after Dr. Rosa examined his left hip. He states that he walked out of Dr. Rosa's office with a limp. Previous hip cortisone  injections did not help as much as he would have liked. He is s/p left KE, performed by Dr. Manpreet Jara at [facility] on 11/25/20.      He is currently in pain management with Dr. Delgado.      He is s/p right knee arthroscopy by Dr. Manzano.     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 He was previously a property . He lives with his wife in [place]. His wife is also a [facility] patient and underwent knee surgery in February.  He has 5 adult daughters, 1 adult son, and 14  grandchildren. He's from [place], [place]. He is not diabetic. He is hypertensive.  He

## 2024-01-29 ENCOUNTER — OFFICE VISIT (OUTPATIENT)
Age: 59
End: 2024-01-29
Payer: MEDICAID

## 2024-01-29 VITALS — BODY MASS INDEX: 36.94 KG/M2 | TEMPERATURE: 98.8 F | HEIGHT: 70 IN | WEIGHT: 258 LBS

## 2024-01-29 DIAGNOSIS — M17.12 UNILATERAL PRIMARY OSTEOARTHRITIS, LEFT KNEE: Primary | ICD-10-CM

## 2024-01-29 DIAGNOSIS — M17.11 UNILATERAL PRIMARY OSTEOARTHRITIS, RIGHT KNEE: ICD-10-CM

## 2024-01-29 PROCEDURE — 20610 DRAIN/INJ JOINT/BURSA W/O US: CPT | Performed by: SPECIALIST

## 2024-02-01 NOTE — PROGRESS NOTES
Patient: Jake Correia                MRN: [id number]       SSN: [id number]  YOB: 1965        AGE: 58 y.o.        SEX: male  Body mass index is 37.02 kg/m².    PCP: Mykel Bryan MD  02/05/24    Chief Complaint   Patient presents with    Knee Pain     bilateral     HISTORY:  Jake Correia is a 58 y.o. male who is seen for bilateral knee pain. He presents today for his second injection in the Synvisc visco supplementation series.      ICD-10-CM    1. Unilateral primary osteoarthritis, left knee  M17.12 DRAIN/INJECT LARGE JOINT/BURSA     Hylan G-F 20 (HYLAN) injection 16 mg      2. Unilateral primary osteoarthritis, right knee  M17.11 DRAIN/INJECT LARGE JOINT/BURSA     Hylan G-F 20 (HYLAN) injection 16 mg         PROCEDURE:  Mr. Correia's knees injected with 2 cc of Synvisc.     Chart reviewed for the following:   Pop VAZ MD, have reviewed the History, Physical and updated the Allergic reactions for Jake Correia     TIME OUT performed immediately prior to start of procedure:  Pop VAZ MD, have performed the following reviews on Jake Correia prior to the start of the procedure:            * Patient was identified by name and date of birth   * Agreement on procedure being performed was verified  * Risks and Benefits explained to the patient  * Procedure site verified and marked as necessary  * Patient was positioned for comfort  * Consent was obtained     Time: 3:21 PM     Date of procedure: 2/5/2024    Procedure performed by:  Pop Hensley MD    Mr. Correia tolerated the procedure well with no complications.    PLAN: Mr. Swift knees injected with 2 cc of Synvisc. Mr. Correia will follow up in one week to complete his visco supplementation injection series.    Documentation by coby Golden, as documented by Pop Hensley MD.

## 2024-02-05 ENCOUNTER — OFFICE VISIT (OUTPATIENT)
Age: 59
End: 2024-02-05
Payer: MEDICAID

## 2024-02-05 VITALS — WEIGHT: 258 LBS | HEIGHT: 70 IN | TEMPERATURE: 98 F | BODY MASS INDEX: 36.94 KG/M2

## 2024-02-05 DIAGNOSIS — M17.12 UNILATERAL PRIMARY OSTEOARTHRITIS, LEFT KNEE: Primary | ICD-10-CM

## 2024-02-05 DIAGNOSIS — M17.11 UNILATERAL PRIMARY OSTEOARTHRITIS, RIGHT KNEE: ICD-10-CM

## 2024-02-05 PROCEDURE — 20610 DRAIN/INJ JOINT/BURSA W/O US: CPT | Performed by: SPECIALIST

## 2024-02-08 NOTE — PROGRESS NOTES
Patient: Jake Correia                MRN: [id number]       SSN: [id number]  YOB: 1965        AGE: 59 y.o.        SEX: male  Body mass index is 37.02 kg/m².    PCP: Mykel Bryan MD  02/12/24    Chief Complaint   Patient presents with    Knee Pain     bilateral     HISTORY:  Jake Correia is a 59 y.o. male who is seen for bilateral knee pain. He presents today for his third injection in the Synvisc visco supplementation series.    PROCEDURE:  Mr. Swift knees injected with 2 cc of Synvisc.     TIME OUT performed immediately prior to start of procedure:  I, Pop Hensley MD, have performed the following reviews on Jake Correia prior to the start of the procedure:            * Patient was identified by name and date of birth   * Agreement on procedure being performed was verified  * Risks and Benefits explained to the patient  * Procedure site verified and marked as necessary  * Patient was positioned for comfort  * Consent was obtained     Time: 2:55 PM     Date of procedure: 2/12/2024    Procedure performed by:  Pop Hensley MD    Mr. Correia tolerated the procedure well with no complications      ICD-10-CM    1. Unilateral primary osteoarthritis, left knee  M17.12 DRAIN/INJECT LARGE JOINT/BURSA     Hylan G-F 20 (HYLAN) injection 16 mg      2. Unilateral primary osteoarthritis, right knee  M17.11 DRAIN/INJECT LARGE JOINT/BURSA     Hylan G-F 20 (HYLAN) injection 16 mg        PLAN: Mr. Swift knees injected with 2 cc of Synvisc. Mr. Correia will follow up PRN now that he has completed his visco supplementation injection series.     Documentation by coby Golden, as documented by Ppo Hensley MD.

## 2024-02-12 ENCOUNTER — OFFICE VISIT (OUTPATIENT)
Age: 59
End: 2024-02-12
Payer: COMMERCIAL

## 2024-02-12 VITALS — WEIGHT: 258 LBS | TEMPERATURE: 98.1 F | HEIGHT: 70 IN | BODY MASS INDEX: 36.94 KG/M2

## 2024-02-12 DIAGNOSIS — M17.12 UNILATERAL PRIMARY OSTEOARTHRITIS, LEFT KNEE: Primary | ICD-10-CM

## 2024-02-12 DIAGNOSIS — M17.11 UNILATERAL PRIMARY OSTEOARTHRITIS, RIGHT KNEE: ICD-10-CM

## 2024-02-12 PROCEDURE — 20610 DRAIN/INJ JOINT/BURSA W/O US: CPT | Performed by: SPECIALIST

## 2024-08-26 ENCOUNTER — TRANSCRIBE ORDERS (OUTPATIENT)
Facility: HOSPITAL | Age: 59
End: 2024-08-26

## 2024-08-26 ENCOUNTER — HOSPITAL ENCOUNTER (OUTPATIENT)
Facility: HOSPITAL | Age: 59
Discharge: HOME | End: 2024-08-29
Payer: COMMERCIAL

## 2024-08-26 DIAGNOSIS — M75.51 BURSITIS OF RIGHT SHOULDER: Primary | ICD-10-CM

## 2024-08-26 DIAGNOSIS — M75.51 BURSITIS OF RIGHT SHOULDER: ICD-10-CM

## 2024-08-26 LAB — SENTARA SPECIMEN COLLECTION: NORMAL

## 2024-08-26 PROCEDURE — 73030 X-RAY EXAM OF SHOULDER: CPT

## 2024-08-26 PROCEDURE — 99001 SPECIMEN HANDLING PT-LAB: CPT

## 2025-02-18 ENCOUNTER — OFFICE VISIT (OUTPATIENT)
Age: 60
End: 2025-02-18
Payer: COMMERCIAL

## 2025-02-18 VITALS — HEIGHT: 70 IN | WEIGHT: 256 LBS | BODY MASS INDEX: 36.65 KG/M2

## 2025-02-18 DIAGNOSIS — M17.12 UNILATERAL PRIMARY OSTEOARTHRITIS, LEFT KNEE: Primary | ICD-10-CM

## 2025-02-18 DIAGNOSIS — M25.562 CHRONIC PAIN OF LEFT KNEE: ICD-10-CM

## 2025-02-18 DIAGNOSIS — G89.29 CHRONIC PAIN OF LEFT KNEE: ICD-10-CM

## 2025-02-18 DIAGNOSIS — G89.29 CHRONIC PAIN OF RIGHT KNEE: ICD-10-CM

## 2025-02-18 DIAGNOSIS — M25.561 CHRONIC PAIN OF RIGHT KNEE: ICD-10-CM

## 2025-02-18 DIAGNOSIS — M17.11 UNILATERAL PRIMARY OSTEOARTHRITIS, RIGHT KNEE: ICD-10-CM

## 2025-02-18 PROCEDURE — 20610 DRAIN/INJ JOINT/BURSA W/O US: CPT | Performed by: SPECIALIST

## 2025-02-18 RX ORDER — BUPIVACAINE HYDROCHLORIDE 5 MG/ML
4 INJECTION, SOLUTION PERINEURAL ONCE
Status: COMPLETED | OUTPATIENT
Start: 2025-02-18 | End: 2025-02-18

## 2025-02-18 RX ORDER — BETAMETHASONE SODIUM PHOSPHATE AND BETAMETHASONE ACETATE 3; 3 MG/ML; MG/ML
3 INJECTION, SUSPENSION INTRA-ARTICULAR; INTRALESIONAL; INTRAMUSCULAR; SOFT TISSUE ONCE
Status: COMPLETED | OUTPATIENT
Start: 2025-02-18 | End: 2025-02-18

## 2025-02-18 RX ADMIN — BUPIVACAINE HYDROCHLORIDE 20 MG: 5 INJECTION, SOLUTION PERINEURAL at 15:15

## 2025-02-18 RX ADMIN — BETAMETHASONE SODIUM PHOSPHATE AND BETAMETHASONE ACETATE 3 MG: 3; 3 INJECTION, SUSPENSION INTRA-ARTICULAR; INTRALESIONAL; INTRAMUSCULAR; SOFT TISSUE at 15:14

## 2025-02-18 NOTE — PROGRESS NOTES
Patient: Jake Correia                MRN: [id number]       SSN: [id number]  YOB: 1965        AGE: 60 y.o.        SEX: male      PCP: Mykel Bryan MD  02/18/25    Chief Complaint   Patient presents with    Knee Pain     MICA     HISTORY:  Jake Correia is a 60 y.o. male who is seen for increased bilateral knee pain. His left knee pain bothers him more than his right knee pain. Patient successfully completed a bilateral Synvisc series on 2/12/24 but his knee pains have returned.  He feels knee pain with standing, walking and stair climbing.  He experiences startup pain after sitting.     He was previously seen for a work-related right knee injury. He tripped and fell over a tree stump on 3/22/23.  He landed directly on his right knee.  He underwent right knee arthroscopy and partial medial menisectomy by Dr. Manzano in 2019.        He was previously seen for bilateral thumb & left hip pains. He states that his hip started hurting after Dr. Rosa examined his left hip. He states that he walked out of Dr. Rosa's office with a limp. Previous hip cortisone injections did not help as much as he would have liked. He is s/p left KE, performed by Dr. Manpreet Jara at [facility] on 11/25/20.      He is currently in pain management with Dr. Delgado.      He is s/p right knee arthroscopy by Dr. Manzano.      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 He was previously a [employer] . He lives with his wife in [place]. His wife is also a [facility] patient and has undergone bilateral TKR. He has 5 adult daughters, 1 adult son, and 14  grandchildren. He's from [place], [place]. He is not diabetic. He is hypertensive.  He gained weight recently.  Mr. Correia weighs 253 lbs and is 5'10\" tall. He has stopped eating beef and fried foods.    Wt Readings from Last 3 Encounters:   02/18/25 116.1 kg (256 lb)   02/12/24 117 kg (258

## 2025-03-26 NOTE — PROGRESS NOTES
Patient: Jake Correia                MRN: [id number]       SSN: [id number]  YOB: 1965        AGE: 60 y.o.        SEX: male      PCP: Mykel Bryan MD  03/28/25    No chief complaint on file.    HISTORY:  Jake Correia is a 60 y.o. male who is seen for bilateral knee pain. He presents today for his first injection in the Euflexxa visco supplementation series.    He was previously seen for increased bilateral knee pain. His left knee pain bothers him more than his right knee pain. Patient successfully completed a bilateral Synvisc series on 2/12/24 but his knee pains have returned.  He feels knee pain with standing, walking and stair climbing.  He experiences startup pain after sitting.      He was previously seen for a work-related right knee injury. He tripped and fell over a tree stump on 3/22/23.  He landed directly on his right knee.  He underwent right knee arthroscopy and partial medial menisectomy by Dr. Manzano in 2019.        He was previously seen for bilateral thumb & left hip pains. He states that his hip started hurting after Dr. Rosa examined his left hip. He states that he walked out of Dr. Rosa's office with a limp. Previous hip cortisone injections did not help as much as he would have liked. He is s/p left KE, performed by Dr. Manpreet Jara at [facility] on 11/25/20.      He is currently in pain management with Dr. Delgado.      He is s/p right knee arthroscopy by Dr. Manzano.      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 He was previously a property . He lives with his wife in [place]. His wife is also a [facility] patient and has undergone bilateral TKR. He has 5 adult daughters, 1 adult son, and 14  grandchildren. He's from [place], [place]. He is not diabetic. He is hypertensive.  He gained weight recently.  Mr. Correia weighs 253 lbs and is 5'10\" tall. He has stopped eating beef and

## 2025-03-28 ENCOUNTER — OFFICE VISIT (OUTPATIENT)
Age: 60
End: 2025-03-28

## 2025-03-28 DIAGNOSIS — M17.11 UNILATERAL PRIMARY OSTEOARTHRITIS, RIGHT KNEE: ICD-10-CM

## 2025-03-28 DIAGNOSIS — M17.12 UNILATERAL PRIMARY OSTEOARTHRITIS, LEFT KNEE: Primary | ICD-10-CM

## 2025-04-01 NOTE — PROGRESS NOTES
Patient: Jake Correia                MRN: [id number]       SSN: [id number]  YOB: 1965        AGE: 60 y.o.        SEX: male  There is no height or weight on file to calculate BMI.    PCP: Mykel Bryan MD  04/04/25    No chief complaint on file.    HISTORY:  Jake Correia is a 60 y.o. male who is seen for bilateral knee pain. He presents today for his second injection in the Euflexxa visco supplementation series.      ICD-10-CM    1. Unilateral primary osteoarthritis, left knee  M17.12       2. Unilateral primary osteoarthritis, right knee  M17.11          PROCEDURE:  Mr. Correia's knees injected with 2 cc of Euflexxa.     Chart reviewed for the following:   Pop VAZ MD, have reviewed the History, Physical and updated the Allergic reactions for Jake Correia     TIME OUT performed immediately prior to start of procedure:  Pop VAZ MD, have performed the following reviews on Jake Correia prior to the start of the procedure:            * Patient was identified by name and date of birth   * Agreement on procedure being performed was verified  * Risks and Benefits explained to the patient  * Procedure site verified and marked as necessary  * Patient was positioned for comfort  * Consent was obtained     Time: 3:19 PM     Date of procedure: 4/4/2025    Procedure performed by:  Pop Hensley MD    Mr. Correia tolerated the procedure well with no complications.    PLAN:  Mr. Correia's knees injected with 2 cc of Euflexxa. Mr. Correia will follow up in one week to complete his visco supplementation injection series.    Documentation by coby Golden, as documented by Pop Hensley MD.

## 2025-04-04 ENCOUNTER — OFFICE VISIT (OUTPATIENT)
Age: 60
End: 2025-04-04

## 2025-04-04 VITALS — HEIGHT: 70 IN | BODY MASS INDEX: 36.08 KG/M2 | TEMPERATURE: 97.1 F | WEIGHT: 252 LBS

## 2025-04-04 DIAGNOSIS — M17.11 UNILATERAL PRIMARY OSTEOARTHRITIS, RIGHT KNEE: ICD-10-CM

## 2025-04-04 DIAGNOSIS — M17.12 UNILATERAL PRIMARY OSTEOARTHRITIS, LEFT KNEE: Primary | ICD-10-CM

## 2025-04-10 NOTE — PROGRESS NOTES
Patient: Jake Correia                MRN: [id number]       SSN: [id number]  YOB: 1965        AGE: 60 y.o.        SEX: male  There is no height or weight on file to calculate BMI.    PCP: Mykel Bryan MD  04/14/25    Chief Complaint   Patient presents with    Knee Pain     bilateral     HISTORY:  Jake Correia is a 60 y.o. male who is seen for bilateral knee pain. He presents today for his third injection in the Euflexxa visco supplementation series.    PROCEDURE:  Mr. Swift knees injected with 2 cc of Euflexxa.     TIME OUT performed immediately prior to start of procedure:  I, Pop Hensley MD, have performed the following reviews on Jake Correia prior to the start of the procedure:            * Patient was identified by name and date of birth   * Agreement on procedure being performed was verified  * Risks and Benefits explained to the patient  * Procedure site verified and marked as necessary  * Patient was positioned for comfort  * Consent was obtained     Time: 10:35 AM     Date of procedure: 4/14/2025    Procedure performed by:  Pop Hensley MD    Mr. Correia tolerated the procedure well with no complications      ICD-10-CM    1. Unilateral primary osteoarthritis, left knee  M17.12 DRAIN/INJECT LARGE JOINT/BURSA     sodium hyaluronate (EUFLEXXA, HYALGAN) injection 20 mg      2. Unilateral primary osteoarthritis, right knee  M17.11 DRAIN/INJECT LARGE JOINT/BURSA     sodium hyaluronate (EUFLEXXA, HYALGAN) injection 20 mg        PLAN: Mr. Swift knees injected with 2 cc of Euflexxa. Mr. Correia will follow up PRN now that he has completed his visco supplementation injection series.     Documentation by coby Golden, as documented by Pop Hensley MD.

## 2025-04-14 ENCOUNTER — OFFICE VISIT (OUTPATIENT)
Age: 60
End: 2025-04-14
Payer: COMMERCIAL

## 2025-04-14 DIAGNOSIS — M17.11 UNILATERAL PRIMARY OSTEOARTHRITIS, RIGHT KNEE: ICD-10-CM

## 2025-04-14 DIAGNOSIS — M17.12 UNILATERAL PRIMARY OSTEOARTHRITIS, LEFT KNEE: Primary | ICD-10-CM

## 2025-04-14 PROCEDURE — 20610 DRAIN/INJ JOINT/BURSA W/O US: CPT | Performed by: SPECIALIST
